# Patient Record
Sex: MALE | Race: WHITE | NOT HISPANIC OR LATINO | Employment: OTHER | URBAN - NONMETROPOLITAN AREA
[De-identification: names, ages, dates, MRNs, and addresses within clinical notes are randomized per-mention and may not be internally consistent; named-entity substitution may affect disease eponyms.]

---

## 2017-07-11 ENCOUNTER — TELEPHONE (OUTPATIENT)
Dept: GASTROENTEROLOGY | Facility: CLINIC | Age: 67
End: 2017-07-11

## 2017-07-11 NOTE — TELEPHONE ENCOUNTER
Patients insurance has changed so had to call into new pharmacy [facility] [phone] esomeprazole 20mg qd #90 3 refills

## 2017-09-12 ENCOUNTER — TRANSCRIBE ORDERS (OUTPATIENT)
Dept: ADMINISTRATIVE | Facility: HOSPITAL | Age: 67
End: 2017-09-12

## 2017-09-12 ENCOUNTER — APPOINTMENT (OUTPATIENT)
Dept: LAB | Facility: HOSPITAL | Age: 67
End: 2017-09-12

## 2017-09-12 DIAGNOSIS — R50.9 FEVER, UNSPECIFIED: Primary | ICD-10-CM

## 2017-09-12 LAB
ALBUMIN SERPL-MCNC: 4.7 G/DL (ref 3.5–5)
ALBUMIN/GLOB SERPL: 1.6 G/DL (ref 1.1–2.5)
ALP SERPL-CCNC: 62 U/L (ref 24–120)
ALT SERPL W P-5'-P-CCNC: 73 U/L (ref 0–54)
ANION GAP SERPL CALCULATED.3IONS-SCNC: 14 MMOL/L (ref 4–13)
AST SERPL-CCNC: 49 U/L (ref 7–45)
BASOPHILS # BLD AUTO: 0.02 10*3/MM3 (ref 0–0.2)
BASOPHILS NFR BLD AUTO: 0.1 % (ref 0–2)
BILIRUB SERPL-MCNC: 1 MG/DL (ref 0.1–1)
BILIRUB UR QL STRIP: NEGATIVE
BUN BLD-MCNC: 10 MG/DL (ref 5–21)
BUN/CREAT SERPL: 11.1 (ref 7–25)
CALCIUM SPEC-SCNC: 9.3 MG/DL (ref 8.4–10.4)
CHLORIDE SERPL-SCNC: 99 MMOL/L (ref 98–110)
CLARITY UR: CLEAR
CO2 SERPL-SCNC: 26 MMOL/L (ref 24–31)
COLOR UR: YELLOW
CREAT BLD-MCNC: 0.9 MG/DL (ref 0.5–1.4)
CRP SERPL-MCNC: 1.4 MG/DL (ref 0–0.99)
DEPRECATED RDW RBC AUTO: 45 FL (ref 40–54)
EOSINOPHIL # BLD AUTO: 0.11 10*3/MM3 (ref 0–0.7)
EOSINOPHIL NFR BLD AUTO: 0.8 % (ref 0–4)
ERYTHROCYTE [DISTWIDTH] IN BLOOD BY AUTOMATED COUNT: 13 % (ref 12–15)
ERYTHROCYTE [SEDIMENTATION RATE] IN BLOOD: <1 MM/HR (ref 0–15)
GFR SERPL CREATININE-BSD FRML MDRD: 84 ML/MIN/1.73
GLOBULIN UR ELPH-MCNC: 2.9 GM/DL
GLUCOSE BLD-MCNC: 172 MG/DL (ref 70–100)
GLUCOSE UR STRIP-MCNC: NEGATIVE MG/DL
HCT VFR BLD AUTO: 44.6 % (ref 40–52)
HGB BLD-MCNC: 15.1 G/DL (ref 14–18)
HGB UR QL STRIP.AUTO: NEGATIVE
IMM GRANULOCYTES # BLD: 0.04 10*3/MM3 (ref 0–0.03)
IMM GRANULOCYTES NFR BLD: 0.3 % (ref 0–5)
KETONES UR QL STRIP: ABNORMAL
LEUKOCYTE ESTERASE UR QL STRIP.AUTO: NEGATIVE
LYMPHOCYTES # BLD AUTO: 0.91 10*3/MM3 (ref 0.72–4.86)
LYMPHOCYTES NFR BLD AUTO: 6.6 % (ref 15–45)
MCH RBC QN AUTO: 32.3 PG (ref 28–32)
MCHC RBC AUTO-ENTMCNC: 33.9 G/DL (ref 33–36)
MCV RBC AUTO: 95.5 FL (ref 82–95)
MONOCYTES # BLD AUTO: 1.36 10*3/MM3 (ref 0.19–1.3)
MONOCYTES NFR BLD AUTO: 9.8 % (ref 4–12)
NEUTROPHILS # BLD AUTO: 11.41 10*3/MM3 (ref 1.87–8.4)
NEUTROPHILS NFR BLD AUTO: 82.4 % (ref 39–78)
NITRITE UR QL STRIP: NEGATIVE
PH UR STRIP.AUTO: 6 [PH] (ref 5–8)
PLATELET # BLD AUTO: 189 10*3/MM3 (ref 130–400)
PMV BLD AUTO: 11.5 FL (ref 6–12)
POTASSIUM BLD-SCNC: 3.9 MMOL/L (ref 3.5–5.3)
PROT SERPL-MCNC: 7.6 G/DL (ref 6.3–8.7)
PROT UR QL STRIP: NEGATIVE
RBC # BLD AUTO: 4.67 10*6/MM3 (ref 4.8–5.9)
SODIUM BLD-SCNC: 139 MMOL/L (ref 135–145)
SP GR UR STRIP: 1.02 (ref 1–1.03)
UROBILINOGEN UR QL STRIP: ABNORMAL
WBC NRBC COR # BLD: 13.85 10*3/MM3 (ref 4.8–10.8)

## 2017-09-12 PROCEDURE — 36415 COLL VENOUS BLD VENIPUNCTURE: CPT

## 2017-09-12 PROCEDURE — 85651 RBC SED RATE NONAUTOMATED: CPT | Performed by: PHYSICIAN ASSISTANT

## 2017-09-12 PROCEDURE — 81003 URINALYSIS AUTO W/O SCOPE: CPT | Performed by: PHYSICIAN ASSISTANT

## 2017-09-12 PROCEDURE — 85025 COMPLETE CBC W/AUTO DIFF WBC: CPT | Performed by: PHYSICIAN ASSISTANT

## 2017-09-12 PROCEDURE — 80053 COMPREHEN METABOLIC PANEL: CPT | Performed by: PHYSICIAN ASSISTANT

## 2017-09-12 PROCEDURE — 86140 C-REACTIVE PROTEIN: CPT | Performed by: PHYSICIAN ASSISTANT

## 2017-10-09 ENCOUNTER — OFFICE VISIT (OUTPATIENT)
Dept: GASTROENTEROLOGY | Facility: CLINIC | Age: 67
End: 2017-10-09

## 2017-10-09 VITALS
SYSTOLIC BLOOD PRESSURE: 138 MMHG | DIASTOLIC BLOOD PRESSURE: 80 MMHG | HEART RATE: 74 BPM | BODY MASS INDEX: 30.34 KG/M2 | OXYGEN SATURATION: 100 % | WEIGHT: 224 LBS | TEMPERATURE: 97.2 F | HEIGHT: 72 IN

## 2017-10-09 DIAGNOSIS — K21.9 GASTROESOPHAGEAL REFLUX DISEASE, ESOPHAGITIS PRESENCE NOT SPECIFIED: Primary | ICD-10-CM

## 2017-10-09 PROCEDURE — 99212 OFFICE O/P EST SF 10 MIN: CPT | Performed by: NURSE PRACTITIONER

## 2017-10-09 NOTE — PROGRESS NOTES
Chief Complaint   Patient presents with   • Heartburn     takes nexium needs refilled     Subjective   HPI    Pt presents to office with long history of GERD related symptoms.  GERD currently described as stable  Pt is maintained on Nexium daily .  Pt denies heartburn, indigestion, N/V, abdominal pain, dysphagia.  Pt is compliant with medication instruction.  Last EGD: 2015 showed active esophagitis.  This procedure reviewed with patient.    CScope (Dr Serna) 2015 tubulovillous polyp removed, recommended for 5 yr recall    Past Medical History:   Diagnosis Date   • Abdominal pain    • Abnormal liver enzymes    • Colon polyps    • Constipation    • Diabetes mellitus    • Diarrhea    • History of rectal bleeding    • Hyperlipidemia    • Hypertension    • Insomnia      Outpatient Prescriptions Marked as Taking for the 10/9/17 encounter (Office Visit) with ALFRED Reilly   Medication Sig Dispense Refill   • aspirin 325 MG tablet Take 325 mg by mouth Daily.     • esomeprazole (nexIUM) 20 MG capsule Take 1 capsule by mouth Every Morning Before Breakfast. 90 capsule 3   • metFORMIN (GLUCOPHAGE) 500 MG tablet Take 1,000 mg by mouth 2 (Two) Times a Day With Meals.     • ramipril (ALTACE) 5 MG capsule Take 5 mg by mouth Daily.     • rosuvastatin (CRESTOR) 10 MG tablet Take 10 mg by mouth Daily.     • zolpidem (AMBIEN) 10 MG tablet Take 10 mg by mouth At Night As Needed for sleep.     • [DISCONTINUED] esomeprazole (NexIUM) 20 MG capsule Take 20 mg by mouth Every Morning Before Breakfast.       Allergies   Allergen Reactions   • Demerol [Meperidine]      Social History     Social History   • Marital status:      Spouse name: N/A   • Number of children: N/A   • Years of education: N/A     Occupational History   • Not on file.     Social History Main Topics   • Smoking status: Never Smoker   • Smokeless tobacco: Never Used   • Alcohol use Yes      Comment: not very often   • Drug use: No   • Sexual activity:  Not on file     Other Topics Concern   • Not on file     Social History Narrative     Family History   Problem Relation Age of Onset   • Anal fissures Father    • Colon cancer Neg Hx    • Colon polyps Neg Hx      Review of Systems   Constitutional: Negative for fatigue, fever and unexpected weight change.   HENT: Negative for hearing loss, sore throat and voice change.    Eyes: Negative for visual disturbance.   Respiratory: Negative for cough, shortness of breath and wheezing.    Cardiovascular: Negative for chest pain and palpitations.   Gastrointestinal: Negative for abdominal pain, blood in stool and vomiting.   Endocrine: Negative for polydipsia and polyuria.   Genitourinary: Negative for difficulty urinating, dysuria, hematuria and urgency.   Musculoskeletal: Negative for joint swelling and myalgias.   Skin: Negative for color change, rash and wound.   Neurological: Negative for dizziness, tremors, seizures and syncope.   Hematological: Does not bruise/bleed easily.   Psychiatric/Behavioral: Negative for agitation and confusion. The patient is not nervous/anxious.      Objective   Vitals:    10/09/17 1031   BP: 138/80   Pulse: 74   Temp: 97.2 °F (36.2 °C)   SpO2: 100%     Physical Exam   Constitutional: He is oriented to person, place, and time. He appears well-developed and well-nourished.   HENT:   Head: Normocephalic and atraumatic.   Eyes:   Pink, Nonicteric   Neck:   Global Assessment- supple. No JVD or lymphadenopathy   Cardiovascular: Normal rate, regular rhythm and normal heart sounds.  Exam reveals no gallop and no friction rub.    No murmur heard.  Pulmonary/Chest: Effort normal and breath sounds normal. No respiratory distress. He has no wheezes. He has no rales.   Inspection: Movements-Symmetrical   Abdominal: Soft. Bowel sounds are normal. He exhibits no distension and no mass. There is no tenderness. There is no rebound and no guarding.   Neurological: He is alert and oriented to person, place,  and time.   General Exam-Deemed a reliable historian, able to converse without difficulty and Able to move all extremities without difficulty     Imaging Results (most recent)     None        Assessment/Plan   Sundar was seen today for heartburn.    Diagnoses and all orders for this visit:    Gastroesophageal reflux disease, esophagitis presence not specified  -     esomeprazole (nexIUM) 20 MG capsule; Take 1 capsule by mouth Every Morning Before Breakfast.      * Surgery not found *    Patient Instructions   Gastroesophageal Reflux Disease, Adult    Gastroesophageal reflux disease (GERD) happens when acid from your stomach flows up into the esophagus. When acid comes in contact with the esophagus, the acid causes soreness (inflammation) in the esophagus. Over time, GERD may create small holes (ulcers) in the lining of the esophagus.  CAUSES    · Increased body weight. This puts pressure on the stomach, making acid rise from the stomach into the esophagus.  · Smoking. This increases acid production in the stomach.  · Drinking alcohol. This causes decreased pressure in the lower esophageal sphincter (valve or ring of muscle between the esophagus and stomach), allowing acid from the stomach into the esophagus.  · Late evening meals and a full stomach. This increases pressure and acid production in the stomach.  · A malformed lower esophageal sphincter.  Sometimes, no cause is found.  SYMPTOMS    · Burning pain in the lower part of the mid-chest behind the breastbone and in the mid-stomach area. This may occur twice a week or more often.  · Trouble swallowing.  · Sore throat.  · Dry cough.  · Asthma-like symptoms including chest tightness, shortness of breath, or wheezing.  DIAGNOSIS    Your caregiver may be able to diagnose GERD based on your symptoms. In some cases, X-rays and other tests may be done to check for complications or to check the condition of your stomach and esophagus.  TREATMENT    Your caregiver may  recommend over-the-counter or prescription medicines to help decrease acid production. Ask your caregiver before starting or adding any new medicines.    HOME CARE INSTRUCTIONS    · Change the factors that you can control. Ask your caregiver for guidance concerning weight loss, quitting smoking, and alcohol consumption.  · Avoid foods and drinks that make your symptoms worse, such as:  ¨ Caffeine or alcoholic drinks.  ¨ Chocolate.  ¨ Peppermint or mint flavorings.  ¨ Garlic and onions.  ¨ Spicy foods.  ¨ Citrus fruits, such as oranges, lesly, or limes.  ¨ Tomato-based foods such as sauce, chili, salsa, and pizza.  ¨ Fried and fatty foods.  · Avoid lying down for the 3 hours prior to your bedtime or prior to taking a nap.  · Eat small, frequent meals instead of large meals.  · Wear loose-fitting clothing. Do not wear anything tight around your waist that causes pressure on your stomach.  · Raise the head of your bed 6 to 8 inches with wood blocks to help you sleep. Extra pillows will not help.  · Only take over-the-counter or prescription medicines for pain, discomfort, or fever as directed by your caregiver.  · Do not take aspirin, ibuprofen, or other nonsteroidal anti-inflammatory drugs (NSAIDs).  SEEK IMMEDIATE MEDICAL CARE IF:    · You have pain in your arms, neck, jaw, teeth, or back.  · Your pain increases or changes in intensity or duration.  · You develop nausea, vomiting, or sweating (diaphoresis).  · You develop shortness of breath, or you faint.  · Your vomit is green, yellow, black, or looks like coffee grounds or blood.  · Your stool is red, bloody, or black.  These symptoms could be signs of other problems, such as heart disease, gastric bleeding, or esophageal bleeding.  MAKE SURE YOU:    · Understand these instructions.  · Will watch your condition.  · Will get help right away if you are not doing well or get worse.     This information is not intended to replace advice given to you by your health  care provider. Make sure you discuss any questions you have with your health care provider.     Document Released: 09/27/2006 Document Revised: 01/08/2016 Document Reviewed: 04/13/2016  [employer] Interactive Patient Education ©2016 [employer]

## 2017-10-09 NOTE — PATIENT INSTRUCTIONS
Gastroesophageal Reflux Disease, Adult    Gastroesophageal reflux disease (GERD) happens when acid from your stomach flows up into the esophagus. When acid comes in contact with the esophagus, the acid causes soreness (inflammation) in the esophagus. Over time, GERD may create small holes (ulcers) in the lining of the esophagus.  CAUSES    · Increased body weight. This puts pressure on the stomach, making acid rise from the stomach into the esophagus.  · Smoking. This increases acid production in the stomach.  · Drinking alcohol. This causes decreased pressure in the lower esophageal sphincter (valve or ring of muscle between the esophagus and stomach), allowing acid from the stomach into the esophagus.  · Late evening meals and a full stomach. This increases pressure and acid production in the stomach.  · A malformed lower esophageal sphincter.  Sometimes, no cause is found.  SYMPTOMS    · Burning pain in the lower part of the mid-chest behind the breastbone and in the mid-stomach area. This may occur twice a week or more often.  · Trouble swallowing.  · Sore throat.  · Dry cough.  · Asthma-like symptoms including chest tightness, shortness of breath, or wheezing.  DIAGNOSIS    Your caregiver may be able to diagnose GERD based on your symptoms. In some cases, X-rays and other tests may be done to check for complications or to check the condition of your stomach and esophagus.  TREATMENT    Your caregiver may recommend over-the-counter or prescription medicines to help decrease acid production. Ask your caregiver before starting or adding any new medicines.    HOME CARE INSTRUCTIONS    · Change the factors that you can control. Ask your caregiver for guidance concerning weight loss, quitting smoking, and alcohol consumption.  · Avoid foods and drinks that make your symptoms worse, such as:  ¨ Caffeine or alcoholic drinks.  ¨ Chocolate.  ¨ Peppermint or mint flavorings.  ¨ Garlic and onions.  ¨ Spicy foods.  ¨ Citrus  fruits, such as oranges, lesly, or limes.  ¨ Tomato-based foods such as sauce, chili, salsa, and pizza.  ¨ Fried and fatty foods.  · Avoid lying down for the 3 hours prior to your bedtime or prior to taking a nap.  · Eat small, frequent meals instead of large meals.  · Wear loose-fitting clothing. Do not wear anything tight around your waist that causes pressure on your stomach.  · Raise the head of your bed 6 to 8 inches with wood blocks to help you sleep. Extra pillows will not help.  · Only take over-the-counter or prescription medicines for pain, discomfort, or fever as directed by your caregiver.  · Do not take aspirin, ibuprofen, or other nonsteroidal anti-inflammatory drugs (NSAIDs).  SEEK IMMEDIATE MEDICAL CARE IF:    · You have pain in your arms, neck, jaw, teeth, or back.  · Your pain increases or changes in intensity or duration.  · You develop nausea, vomiting, or sweating (diaphoresis).  · You develop shortness of breath, or you faint.  · Your vomit is green, yellow, black, or looks like coffee grounds or blood.  · Your stool is red, bloody, or black.  These symptoms could be signs of other problems, such as heart disease, gastric bleeding, or esophageal bleeding.  MAKE SURE YOU:    · Understand these instructions.  · Will watch your condition.  · Will get help right away if you are not doing well or get worse.     This information is not intended to replace advice given to you by your health care provider. Make sure you discuss any questions you have with your health care provider.     Document Released: 09/27/2006 Document Revised: 01/08/2016 Document Reviewed: 04/13/2016  [employer] Interactive Patient Education ©2016 [employer]

## 2017-11-09 ENCOUNTER — TRANSCRIBE ORDERS (OUTPATIENT)
Dept: ADMINISTRATIVE | Facility: HOSPITAL | Age: 67
End: 2017-11-09

## 2017-11-09 DIAGNOSIS — K76.0 FATTY METAMORPHOSIS OF LIVER: Primary | ICD-10-CM

## 2017-11-16 ENCOUNTER — HOSPITAL ENCOUNTER (OUTPATIENT)
Dept: ULTRASOUND IMAGING | Facility: HOSPITAL | Age: 67
Discharge: HOME | End: 2017-11-16
Admitting: PHYSICIAN ASSISTANT

## 2017-11-16 DIAGNOSIS — K76.0 FATTY METAMORPHOSIS OF LIVER: ICD-10-CM

## 2017-11-16 PROCEDURE — 76705 ECHO EXAM OF ABDOMEN: CPT

## 2018-10-04 DIAGNOSIS — K21.9 GASTROESOPHAGEAL REFLUX DISEASE, ESOPHAGITIS PRESENCE NOT SPECIFIED: ICD-10-CM

## 2019-05-13 ENCOUNTER — TRANSCRIBE ORDERS (OUTPATIENT)
Dept: ADMINISTRATIVE | Facility: HOSPITAL | Age: 69
End: 2019-05-13

## 2019-05-13 DIAGNOSIS — M10.9 GOUT, UNSPECIFIED CAUSE, UNSPECIFIED CHRONICITY, UNSPECIFIED SITE: Primary | ICD-10-CM

## 2021-03-17 NOTE — PROGRESS NOTES
Chief Complaint   Patient presents with   • Colonoscopy     7-6-2015 colon small polyp 5 year recall       PCP: Oren Rivera MD  REFER: No ref. provider found    Subjective     HPI    Sundar Rojas is a 70 y.o. male who presents to office for preventative maintenance.  There is  a personal history of colon polyps.  There is not a history of colon cancer.  He does not have complaints of nausea/vomiting, change in bowels, weight loss, no BRBPR, no melena.  There is not a family history of colon cancer.  There is not a family history of colon polyps.  His last colonoscopy-2015 .  Bowels do move on regular basis.    CScope (Dr Serna) 2015-tubullovillous adenoma       Past Medical History:   Diagnosis Date   • Abdominal pain    • Abnormal liver enzymes    • Colon polyps    • Constipation    • Diabetes mellitus (CMS/HCC)    • Diarrhea    • History of rectal bleeding    • Hyperlipidemia    • Hypertension    • Insomnia      Past Surgical History:   Procedure Laterality Date   • CHOLECYSTECTOMY     • COLONOSCOPY  06/04/2012    tubulobillous adenoma 3 year   • COLONOSCOPY  06/07/2010    multiple polypecytomies - see path -- 2 yr   • COLONOSCOPY  04/06/2004   • ENDOSCOPY  02/16/2007   • HEMORRHOIDECTOMY     • KNEE ARTHROSCOPY       Outpatient Medications Marked as Taking for the 3/18/21 encounter (Office Visit) with Maximino Baptiste APRN   Medication Sig Dispense Refill   • amLODIPine (NORVASC) 5 MG tablet Take 5 mg by mouth Daily.     • aspirin 81 MG EC tablet Take 81 mg by mouth Daily.     • esomeprazole (nexIUM) 20 MG capsule Take 1 capsule by mouth Daily. 180 capsule 3   • glimepiride (AMARYL) 2 MG tablet Take 2 mg by mouth 2 (Two) Times a Day.     • metFORMIN (GLUCOPHAGE) 500 MG tablet Take 1,000 mg by mouth 2 (Two) Times a Day With Meals.     • ramipril (ALTACE) 5 MG capsule Take 5 mg by mouth Daily.     • rosuvastatin (CRESTOR) 10 MG tablet Take 10 mg by mouth Daily.     • zolpidem (AMBIEN) 10 MG tablet  Take 10 mg by mouth At Night As Needed for sleep.       Allergies   Allergen Reactions   • Demerol [Meperidine]      Social History     Socioeconomic History   • Marital status:      Spouse name: Not on file   • Number of children: Not on file   • Years of education: Not on file   • Highest education level: Not on file   Tobacco Use   • Smoking status: Never Smoker   • Smokeless tobacco: Never Used   Substance and Sexual Activity   • Alcohol use: Yes     Comment: not very often   • Drug use: No     Review of Systems   Constitutional: Negative for unexpected weight change.   Respiratory: Negative for shortness of breath.    Cardiovascular: Negative for chest pain.   Gastrointestinal: Negative for abdominal pain and anal bleeding.     Objective   Vitals:    03/18/21 0938   BP: 140/80   Pulse: 70   Temp: 98.4 °F (36.9 °C)   SpO2: 98%     Physical Exam  Constitutional:       Appearance: Normal appearance. He is well-developed.   Eyes:      General: No scleral icterus.  Cardiovascular:      Rate and Rhythm: Regular rhythm.      Heart sounds: Normal heart sounds. No murmur heard.     Pulmonary:      Effort: Pulmonary effort is normal. No accessory muscle usage.      Breath sounds: Normal breath sounds.   Abdominal:      General: Bowel sounds are normal. There is no distension.      Palpations: Abdomen is soft. There is no mass.      Tenderness: There is no abdominal tenderness. There is no guarding or rebound.   Skin:     General: Skin is warm and dry.      Coloration: Skin is not jaundiced.   Neurological:      Mental Status: He is alert.   Psychiatric:         Behavior: Behavior is cooperative.       Imaging Results (Most Recent)     None        Body mass index is 31.06 kg/m².    Assessment/Plan   Diagnoses and all orders for this visit:    1. History of adenomatous polyp of colon (Primary)  -     Case Request; Standing  -     Implement Anesthesia Orders Day of Procedure; Standing  -     Obtain Informed Consent;  Standing  -     Case Request      COLONOSCOPY WITH ANESTHESIA (N/A)    Complain of ulcerations to tongue - offered  EGD as he had active esophagitis on previous scope, patient declined.  Ok to trial pepcid at bedtime x 2 weeks, if no improvement then stop pepcid    Pt to hold diabetes medication/insulin day of procedure to prevent any risk of complications of hypoglycemia intraprocedure.  If taking insulin 1/2 the PM dose as well     Patient is to continue all blood pressure and cardiac medications prior to procedure and has been advised to take medications morning of procedure   Pt verbalized understanding    Advised pt to stop use of NSAIDs, Fish Oil, and MV 5 days prior to procedure, per Dr Serna protocol.  Tylenol based products are ok to take.  Pt verbalized understanding.     All risks, benefits, alternatives, and indications of colonoscopy procedure have been discussed with the patient. Risks to include perforation of the colon requiring possible surgery or colostomy, risk of bleeding from biopsies or removal of colon tissue, possibility of missing a colon polyp or cancer, or adverse drug reaction.  Benefits to include the diagnosis and management of disease of the colon and rectum. Alternatives to include barium enema, radiographic evaluation, lab testing or no intervention. He verbalizes understanding and agrees.     Precautions are currently being put in place due to COVID-19.  I have explained to Sundar Rojas they will be required to undergo COVID testing prior to their procedure.  Sundar Rojas verbalized understanding and was willing to proceed.     Patient's Body mass index is 31.06 kg/m². BMI is above normal parameters. Recommendations include: no follow up.          Maximino Baptiste, APRN  03/18/21        There are no Patient Instructions on file for this visit.

## 2021-03-18 ENCOUNTER — OFFICE VISIT (OUTPATIENT)
Dept: GASTROENTEROLOGY | Facility: CLINIC | Age: 71
End: 2021-03-18

## 2021-03-18 VITALS
TEMPERATURE: 98.4 F | OXYGEN SATURATION: 98 % | HEART RATE: 70 BPM | WEIGHT: 229 LBS | HEIGHT: 72 IN | BODY MASS INDEX: 31.02 KG/M2 | SYSTOLIC BLOOD PRESSURE: 140 MMHG | DIASTOLIC BLOOD PRESSURE: 80 MMHG

## 2021-03-18 DIAGNOSIS — Z86.010 HISTORY OF ADENOMATOUS POLYP OF COLON: Primary | ICD-10-CM

## 2021-03-18 PROCEDURE — S0260 H&P FOR SURGERY: HCPCS | Performed by: NURSE PRACTITIONER

## 2021-03-18 RX ORDER — ASPIRIN 81 MG/1
81 TABLET ORAL DAILY
COMMUNITY

## 2021-03-18 RX ORDER — AMLODIPINE BESYLATE 5 MG/1
5 TABLET ORAL DAILY
COMMUNITY

## 2021-03-18 RX ORDER — GLIMEPIRIDE 2 MG/1
2 TABLET ORAL 2 TIMES DAILY
COMMUNITY
Start: 2021-03-12

## 2021-03-23 ENCOUNTER — TELEPHONE (OUTPATIENT)
Dept: GASTROENTEROLOGY | Facility: CLINIC | Age: 71
End: 2021-03-23

## 2021-03-31 ENCOUNTER — TRANSCRIBE ORDERS (OUTPATIENT)
Dept: LAB | Facility: HOSPITAL | Age: 71
End: 2021-03-31

## 2021-03-31 DIAGNOSIS — Z01.818 PREOPERATIVE TESTING: Primary | ICD-10-CM

## 2021-04-03 ENCOUNTER — LAB (OUTPATIENT)
Dept: LAB | Facility: HOSPITAL | Age: 71
End: 2021-04-03

## 2021-04-03 LAB — SARS-COV-2 ORF1AB RESP QL NAA+PROBE: NOT DETECTED

## 2021-04-03 PROCEDURE — C9803 HOPD COVID-19 SPEC COLLECT: HCPCS | Performed by: INTERNAL MEDICINE

## 2021-04-03 PROCEDURE — U0004 COV-19 TEST NON-CDC HGH THRU: HCPCS | Performed by: INTERNAL MEDICINE

## 2021-04-03 PROCEDURE — U0005 INFEC AGEN DETEC AMPLI PROBE: HCPCS | Performed by: INTERNAL MEDICINE

## 2021-04-06 ENCOUNTER — ANESTHESIA EVENT (OUTPATIENT)
Dept: GASTROENTEROLOGY | Facility: HOSPITAL | Age: 71
End: 2021-04-06

## 2021-04-06 ENCOUNTER — ANESTHESIA (OUTPATIENT)
Dept: GASTROENTEROLOGY | Facility: HOSPITAL | Age: 71
End: 2021-04-06

## 2021-04-06 ENCOUNTER — HOSPITAL ENCOUNTER (OUTPATIENT)
Facility: HOSPITAL | Age: 71
Setting detail: HOSPITAL OUTPATIENT SURGERY
Discharge: HOME | End: 2021-04-06
Attending: INTERNAL MEDICINE | Admitting: INTERNAL MEDICINE

## 2021-04-06 ENCOUNTER — TELEPHONE (OUTPATIENT)
Dept: GASTROENTEROLOGY | Facility: CLINIC | Age: 71
End: 2021-04-06

## 2021-04-06 VITALS
RESPIRATION RATE: 16 BRPM | TEMPERATURE: 97.4 F | BODY MASS INDEX: 30.61 KG/M2 | HEIGHT: 72 IN | OXYGEN SATURATION: 95 % | WEIGHT: 226 LBS | SYSTOLIC BLOOD PRESSURE: 113 MMHG | DIASTOLIC BLOOD PRESSURE: 68 MMHG | HEART RATE: 82 BPM

## 2021-04-06 DIAGNOSIS — Z86.010 HISTORY OF ADENOMATOUS POLYP OF COLON: ICD-10-CM

## 2021-04-06 LAB — GLUCOSE BLDC GLUCOMTR-MCNC: 138 MG/DL (ref 70–130)

## 2021-04-06 PROCEDURE — 43239 EGD BIOPSY SINGLE/MULTIPLE: CPT | Performed by: INTERNAL MEDICINE

## 2021-04-06 PROCEDURE — 82962 GLUCOSE BLOOD TEST: CPT

## 2021-04-06 PROCEDURE — 45385 COLONOSCOPY W/LESION REMOVAL: CPT | Performed by: INTERNAL MEDICINE

## 2021-04-06 PROCEDURE — 87081 CULTURE SCREEN ONLY: CPT | Performed by: INTERNAL MEDICINE

## 2021-04-06 PROCEDURE — 25010000002 PROPOFOL 10 MG/ML EMULSION: Performed by: NURSE ANESTHETIST, CERTIFIED REGISTERED

## 2021-04-06 RX ORDER — MIDAZOLAM HYDROCHLORIDE 1 MG/ML
0.5 INJECTION, SOLUTION INTRAMUSCULAR; INTRAVENOUS
Status: CANCELLED | OUTPATIENT
Start: 2021-04-06

## 2021-04-06 RX ORDER — MIDAZOLAM HYDROCHLORIDE 1 MG/ML
1 INJECTION, SOLUTION INTRAMUSCULAR; INTRAVENOUS
Status: CANCELLED | OUTPATIENT
Start: 2021-04-06

## 2021-04-06 RX ORDER — SODIUM CHLORIDE 9 MG/ML
500 INJECTION, SOLUTION INTRAVENOUS CONTINUOUS PRN
Status: DISCONTINUED | OUTPATIENT
Start: 2021-04-06 | End: 2021-04-06 | Stop reason: HOSPADM

## 2021-04-06 RX ORDER — LIDOCAINE HYDROCHLORIDE 10 MG/ML
0.5 INJECTION, SOLUTION EPIDURAL; INFILTRATION; INTRACAUDAL; PERINEURAL ONCE AS NEEDED
Status: CANCELLED | OUTPATIENT
Start: 2021-04-06

## 2021-04-06 RX ORDER — PROPOFOL 10 MG/ML
VIAL (ML) INTRAVENOUS AS NEEDED
Status: DISCONTINUED | OUTPATIENT
Start: 2021-04-06 | End: 2021-04-06 | Stop reason: SURG

## 2021-04-06 RX ORDER — SODIUM CHLORIDE 0.9 % (FLUSH) 0.9 %
10 SYRINGE (ML) INJECTION EVERY 12 HOURS SCHEDULED
Status: CANCELLED | OUTPATIENT
Start: 2021-04-06

## 2021-04-06 RX ORDER — SODIUM CHLORIDE 0.9 % (FLUSH) 0.9 %
10 SYRINGE (ML) INJECTION AS NEEDED
Status: CANCELLED | OUTPATIENT
Start: 2021-04-06

## 2021-04-06 RX ORDER — SODIUM CHLORIDE 0.9 % (FLUSH) 0.9 %
10 SYRINGE (ML) INJECTION AS NEEDED
Status: DISCONTINUED | OUTPATIENT
Start: 2021-04-06 | End: 2021-04-06 | Stop reason: HOSPADM

## 2021-04-06 RX ORDER — SODIUM CHLORIDE 9 MG/ML
100 INJECTION, SOLUTION INTRAVENOUS CONTINUOUS
Status: CANCELLED | OUTPATIENT
Start: 2021-04-06

## 2021-04-06 RX ORDER — LIDOCAINE HYDROCHLORIDE 20 MG/ML
INJECTION, SOLUTION EPIDURAL; INFILTRATION; INTRACAUDAL; PERINEURAL AS NEEDED
Status: DISCONTINUED | OUTPATIENT
Start: 2021-04-06 | End: 2021-04-06 | Stop reason: SURG

## 2021-04-06 RX ADMIN — PROPOFOL 100 MG: 10 INJECTION, EMULSION INTRAVENOUS at 08:16

## 2021-04-06 RX ADMIN — PROPOFOL 100 MG: 10 INJECTION, EMULSION INTRAVENOUS at 08:23

## 2021-04-06 RX ADMIN — SODIUM CHLORIDE 500 ML: 9 INJECTION, SOLUTION INTRAVENOUS at 07:27

## 2021-04-06 RX ADMIN — PROPOFOL 50 MG: 10 INJECTION, EMULSION INTRAVENOUS at 08:31

## 2021-04-06 RX ADMIN — PROPOFOL 50 MG: 10 INJECTION, EMULSION INTRAVENOUS at 08:28

## 2021-04-06 RX ADMIN — LIDOCAINE HYDROCHLORIDE 100 MG: 20 INJECTION, SOLUTION EPIDURAL; INFILTRATION; INTRACAUDAL; PERINEURAL at 08:16

## 2021-04-06 NOTE — ANESTHESIA POSTPROCEDURE EVALUATION
Patient: Sundar Rojas    Procedure Summary     Date: 04/06/21 Room / Location: [facility] ENDOSCOPY 6 / [facility]    Anesthesia Start: 0814 Anesthesia Stop: 0835    Procedures:       COLONOSCOPY WITH ANESTHESIA (N/A )      ESOPHAGOGASTRODUODENOSCOPY WITH ANESTHESIA (N/A ) Diagnosis:       History of adenomatous polyp of colon      (History of adenomatous polyp of colon [Z86.010])    Surgeons: Lopez Serna DO Provider: Vern Mckeon CRNA    Anesthesia Type: MAC ASA Status: 2          Anesthesia Type: MAC    Vitals  Vitals Value Taken Time   BP     Temp     Pulse 73 04/06/21 0835   Resp     SpO2 96 % 04/06/21 0835   Vitals shown include unvalidated device data.        Post Anesthesia Care and Evaluation    Patient location during evaluation: PHASE II  Patient participation: complete - patient participated  Level of consciousness: awake and alert  Pain score: 0  Pain management: adequate  Airway patency: patent  Anesthetic complications: No anesthetic complications  PONV Status: none  Cardiovascular status: acceptable and stable  Respiratory status: acceptable  Hydration status: acceptable

## 2021-04-06 NOTE — ANESTHESIA PREPROCEDURE EVALUATION
Anesthesia Evaluation     Patient summary reviewed and Nursing notes reviewed   NPO Solid Status: > 8 hours  NPO Liquid Status: > 4 hours           Airway   Mallampati: I  TM distance: >3 FB  No difficulty expected  Dental      Pulmonary - negative pulmonary ROS and normal exam   Cardiovascular - normal exam  Exercise tolerance: good (4-7 METS)    (+) hypertension well controlled less than 2 medications, hyperlipidemia,       Neuro/Psych- negative ROS  GI/Hepatic/Renal/Endo    (+)  GERD well controlled,  liver disease fatty liver disease, diabetes mellitus type 2 well controlled,     Musculoskeletal (-) negative ROS    Abdominal  - normal exam   Substance History - negative use     OB/GYN negative ob/gyn ROS         Other - negative ROS                       Anesthesia Plan    ASA 2     MAC     intravenous induction     Anesthetic plan, all risks, benefits, and alternatives have been provided, discussed and informed consent has been obtained with: patient and spouse/significant other.

## 2021-04-07 LAB — UREASE TISS QL: NEGATIVE

## 2021-12-21 ENCOUNTER — OFFICE VISIT (OUTPATIENT)
Dept: GASTROENTEROLOGY | Facility: CLINIC | Age: 71
End: 2021-12-21

## 2021-12-21 DIAGNOSIS — R19.8 ALTERED BOWEL FUNCTION: Primary | ICD-10-CM

## 2021-12-21 PROCEDURE — 99213 OFFICE O/P EST LOW 20 MIN: CPT | Performed by: NURSE PRACTITIONER

## 2021-12-21 NOTE — PROGRESS NOTES
Chief Complaint   Patient presents with   • Abdominal Pain     lower stomach problems lots of gas lots of diarrhea       PCP: Oren Rivera MD  REFER: No ref. provider found    Subjective     HPI    Change in bowel habit over past 2 months.  Normal bowel habit described as 2-3 BM in morning on daily basis.  First bowel movement described as regular with 2nd and 3rd BM being loose until it is watery.  He has lower abdominal bloating with pressure to lower abdomen.  He was treated with 2 rounds of antibiotic in Nov for this complaint.  He started taking omeprazole daily with improvement in bloating.  Normal stool is thin in shape.  After he eats he complain of pain that travels through abdomen.   Currently trying to lose weight with low carb diet.  A1C was elevated.      CScope (Dr Serna) 4/2021  Endoscopy (Dr Serna) 4/2021  - non severe esophagitis    Wt Readings from Last 3 Encounters:   04/06/21 103 kg (226 lb)   03/18/21 104 kg (229 lb)   10/09/17 102 kg (224 lb)          Past Medical History:   Diagnosis Date   • Abdominal pain    • Abnormal liver enzymes    • Colon polyps    • Constipation    • Diabetes mellitus (HCC)    • Diarrhea    • GERD (gastroesophageal reflux disease)    • History of rectal bleeding    • Hyperlipidemia    • Hypertension    • Insomnia        Past Surgical History:   Procedure Laterality Date   • CHOLECYSTECTOMY     • COLONOSCOPY  06/04/2012    tubulobillous adenoma 3 year   • COLONOSCOPY  06/07/2010    multiple polypecytomies - see path -- 2 yr   • COLONOSCOPY  04/06/2004   • COLONOSCOPY N/A 4/6/2021    Procedure: COLONOSCOPY WITH ANESTHESIA;  Surgeon: Lopez Serna DO;  Location: [facility] ENDOSCOPY;  Service: Gastroenterology;  Laterality: N/A;  pre hx adenomatous colon polyp  post diverticulosis; polyps   Oren Rivera MD   • ENDOSCOPY  02/16/2007   • ENDOSCOPY N/A 4/6/2021    Procedure: ESOPHAGOGASTRODUODENOSCOPY WITH ANESTHESIA;  Surgeon: Lopez Serna DO;   Location: [facility] ENDOSCOPY;  Service: Gastroenterology;  Laterality: N/A;  pre heartburn  post normal  Oren Rivera MD   • EYE SURGERY Bilateral     cataract   • HEMORRHOIDECTOMY     • KNEE ARTHROSCOPY         Outpatient Medications Marked as Taking for the 12/21/21 encounter (Office Visit) with Maximino Baptiste APRN   Medication Sig Dispense Refill   • amLODIPine (NORVASC) 5 MG tablet Take 5 mg by mouth Daily.     • aspirin 81 MG EC tablet Take 81 mg by mouth Daily.     • esomeprazole (nexIUM) 20 MG capsule Take 1 capsule by mouth Daily. 180 capsule 3   • esomeprazole (nexIUM) 20 MG capsule Take 1 capsule by mouth Every Morning Before Breakfast. 90 capsule 3   • glimepiride (AMARYL) 2 MG tablet Take 2 mg by mouth 2 (Two) Times a Day.     • metFORMIN (GLUCOPHAGE) 500 MG tablet Take 1,000 mg by mouth 2 (Two) Times a Day With Meals.     • Metoprolol Succinate 100 MG capsule extended-release 24 hour sprinkle Take  by mouth.     • ramipril (ALTACE) 5 MG capsule Take 5 mg by mouth Daily.     • rosuvastatin (CRESTOR) 10 MG tablet Take 10 mg by mouth Daily.     • zolpidem (AMBIEN) 10 MG tablet Take 10 mg by mouth At Night As Needed for sleep.         Allergies   Allergen Reactions   • Demerol [Meperidine] Confusion       Social History     Socioeconomic History   • Marital status:    Tobacco Use   • Smoking status: Never Smoker   • Smokeless tobacco: Never Used   Vaping Use   • Vaping Use: Never used   Substance and Sexual Activity   • Alcohol use: Yes     Comment: not very often   • Drug use: No   • Sexual activity: Defer       Review of Systems   Constitutional: Negative for unexpected weight change.   Respiratory: Negative for shortness of breath.    Cardiovascular: Negative for chest pain.   Gastrointestinal: Positive for abdominal pain. Negative for anal bleeding.       Objective     There were no vitals filed for this visit.  There is no height or weight on file to calculate BMI.    Physical  Exam  Constitutional:       Appearance: Normal appearance. He is well-developed.   Eyes:      General: No scleral icterus.  Cardiovascular:      Rate and Rhythm: Regular rhythm.      Heart sounds: Normal heart sounds. No murmur heard.      Pulmonary:      Effort: Pulmonary effort is normal. No accessory muscle usage.      Breath sounds: Normal breath sounds.   Abdominal:      General: Bowel sounds are normal. There is no distension.      Palpations: Abdomen is soft. There is no mass.      Tenderness: There is no abdominal tenderness. There is no guarding or rebound.   Skin:     General: Skin is warm and dry.      Coloration: Skin is not jaundiced.   Neurological:      Mental Status: He is alert.   Psychiatric:         Behavior: Behavior is cooperative.         Imaging Results (Most Recent)     None          There is no height or weight on file to calculate BMI.    Assessment/Plan     There are no diagnoses linked to this encounter.    * Surgery not found *    Due to resent colonoscopy no plans on repeat procedure.   Encouraged to avoid constipation.  Increase daily water intake.  Continue daily physical activity.      If symptoms do not improve with increasing dietary fiber/miralax, could consider repeat procedure    I did discuss changes in diet/fiber/miralax vs proceeding with colonoscopy.  I did explain he has undergone unremarkable colonoscopy and it is not normal for cancers to be slow growing.  Sundar Rojas verbalized understanding wished to make changes to lifestyle.  He will call with update.          Maximino Baptiste, APRN  12/27/21          Patient Instructions   Utilize fiber use, increase daily water consumption  If no improvement ok to add Miralax   Ok to utilize Miralax and adjust as needed    Fiber Content in Foods  Fiber is a substance that is found in plant foods, such as fruits, vegetables, whole grains, nuts, seeds, and beans. As part of your treatment and recovery plan, your health care  provider may recommend that you eat foods that have specific amounts of dietary fiber. Some conditions may require a high-fiber diet while others may require a low-fiber diet.  This sheet gives you information about the dietary fiber content of some common foods. Your health care provider will tell you how much fiber you need in your diet. If you have problems or questions, contact your health care provider or dietitian.  What foods are high in fiber?    Fruits  · Blackberries or raspberries (fresh) -- ½ cup (75 g) has 4 g of fiber.  · Pear (fresh) -- 1 medium (180 g) has 5.5 g of fiber.  · Prunes (dried) -- 6 to 8 pieces (57-76 g) has 5 g of fiber.  · Apple with skin -- 1 medium (182 g) has 4.8 g of fiber.  · Guava -- 1 cup (128 g) has 8.9 g of fiber.  Vegetables  · Peas (frozen) -- ½ cup (80 g) has 4.4 g of fiber.  · Potato with skin (baked) -- 1 medium (173 g) has 4.4 g of fiber.  · Pumpkin (canned) -- ½ cup (122 g) has 5 g of fiber.  · [place] sprouts (cooked) -- ½ cup (78 g) has 4 g of fiber.  · Sweet potato -- ½ cup mashed (124 g) has 4 g of fiber.  · Winter squash -- 1 cup cooked (205 g) has 5.7 g of fiber.  Grains  · Bran cereal -- ½ cup (31 g) has 8.6 g of fiber.  · Bulgur (cooked) -- ½ cup (70 g) has 4 g of fiber.  · Quinoa (cooked) -- 1 cup (185 g) has 5.2 g of fiber.  · Popcorn -- 3 cups (375 g) popped has 5.8 g of fiber.  · Spaghetti, whole wheat -- 1 cup (140 g) has 6 g of fiber.  Meats and other proteins  · Gallego beans (cooked) -- ½ cup (90 g) has 7.7 g of fiber.  · Lentils (cooked) -- ½ cup (90 g) has 7.8 g of fiber.  · Kidney beans (canned) -- ½ cup (92.5 g) has 5.7 g of fiber.  · Soybeans (canned, frozen, or fresh) -- ½ cup (92.5 g) has 5.2 g of fiber.  · Baked beans, plain or vegetarian (canned) -- ½ cup (130 g) has 5.2 g of fiber.  · Garbanzo beans or chickpeas (canned) -- ½ cup (90 g) has 6.6 g of fiber.  · Black beans (cooked) -- ½ cup (86 g) has 7.5 g of fiber.  · White beans or navy beans  (cooked) -- ½ cup (91 g) has 9.3 g of fiber.  The items listed above may not be a complete list of foods with high fiber. Actual amounts of fiber may be different depending on processing. Contact a dietitian for more information.  What foods are moderate in fiber?    Fruits  · Banana -- 1 medium (126 g) has 3.2 g of fiber.  · Melon -- 1 cup (155 g) has 1.4 g of fiber.  · Orange -- 1 small (154 g) has 3.7 g of fiber.  · Raisins -- ¼ cup (40 g) has 1.8 g of fiber.  · Applesauce, sweetened -- ½ cup (125 g) has 1.5 g of fiber.  · Blueberries (fresh) -- ½ cup (75 g) has 1.8 g of fiber.  · Strawberries (fresh, sliced) -- 1 cup (150 g) has 3 g of fiber.  · Cherries -- 1 cup (140 g) has 2.9 g of fiber.  Vegetables  · Broccoli (cooked) -- ½ cup (77.5 g) has 2.1 g of fiber.  · Carrots (cooked) -- ½ cup (77.5 g) has 2.2 g of fiber.  · Corn (canned or frozen) -- ½ cup (82.5 g) has 2.1 g of fiber.  · Potatoes, mashed -- ½ cup (105 g) has 1.6 g of fiber.  · Tomato -- 1 medium (62 g) has 1.5 g of fiber.  · Green beans (canned) -- ½ cup (83 g) has 2 g of fiber.  · Squash, winter -- ½ cup (58 g) has 1 g of fiber.  · Sweet potato, baked -- 1 medium (150 g) has 3 g of fiber.  · Cauliflower (cooked) -- 1/2 cup (90 g) has 2.3 g of fiber.  Grains  · Long-grain brown rice (cooked) -- 1 cup (196 g) has 3.5 g of fiber.  · Bagel, plain -- one 4-inch (10 cm) bagel has 2 g of fiber.  · Instant oatmeal -- ½ cup (120 g) has about 2 g of fiber.  · Macaroni noodles, enriched (cooked) -- 1 cup (140 g) has 2.5 g of fiber.  · Multigrain cereal -- ½ cup (15 g) has about 2-4 g of fiber.  · Whole-wheat bread -- 1 slice (26 g) has 2 g of fiber.  · Whole-wheat spaghetti noodles -- ½ cup (70 g) has 3.2 g of fiber.  · Corn tortilla -- one 6-inch (15 cm) tortilla has 1.5 g of fiber.  Meats and other proteins  · Almonds -- ¼ cup or 1 oz (28 g) has 3.5 g of fiber.  · Sunflower seeds in shell -- ¼ cup or ½ oz (11.5 g) has 1.1 g of fiber.  · Vegetable or soy  pushpa -- 1 pushpa (70 g) has 3.4 g of fiber.  · Walnuts -- ¼ cup or 1 oz (30 g) has 2 g of fiber.  · Flax seed -- 1 Tbsp (7 g) has 2.8 g of fiber.  The items listed above may not be a complete list of foods that have moderate amounts of fiber. Actual amounts of fiber may be different depending on processing. Contact a dietitian for more information.  What foods are low in fiber?    Low-fiber foods contain less than 1 g of fiber per serving. They include:  Fruits  · Fruit juice -- ½ cup or 4 fl oz (118 mL) has 0.5 g of fiber.  Vegetables  · Lettuce -- 1 cup (35 g) has 0.5 g of fiber.  · Cucumber (slices) -- ½ cup (60 g) has 0.3 g of fiber.  · Celery -- 1 stalk (40 g) has 0.1 g of fiber.  Grains  · Flour tortilla -- one 6-inch (15 cm) tortilla has 0.5 g of fiber.  · White rice (cooked) -- ½ cup (81.5 g) has 0.3 g of fiber.  Meats and other proteins  · Egg -- 1 large (50 g) has 0 g of fiber.  · Meat, poultry, or fish -- 3 oz (85 g) has 0 g of fiber.  Dairy  · Milk -- 1 cup or 8 fl oz (237 mL) has 0 g of fiber.  · Yogurt -- 1 cup (245 g) has 0 g of fiber.  The items listed above may not be a complete list of foods that are low in fiber. Actual amounts of fiber may be different depending on processing. Contact a dietitian for more information.  Summary  · Fiber is a substance that is found in plant foods, such as fruits, vegetables, whole grains, nuts, seeds, and beans.  · As part of your treatment and recovery plan, your health care provider may recommend that you eat foods that have specific amounts of dietary fiber.  This information is not intended to replace advice given to you by your health care provider. Make sure you discuss any questions you have with your health care provider.  Document Revised: 04/22/2021 Document Reviewed: 04/22/2021  Elsevier Patient Education © 2021 [employer]

## 2021-12-21 NOTE — PATIENT INSTRUCTIONS
Utilize fiber use, increase daily water consumption  If no improvement ok to add Miralax   Ok to utilize Miralax and adjust as needed    Fiber Content in Foods  Fiber is a substance that is found in plant foods, such as fruits, vegetables, whole grains, nuts, seeds, and beans. As part of your treatment and recovery plan, your health care provider may recommend that you eat foods that have specific amounts of dietary fiber. Some conditions may require a high-fiber diet while others may require a low-fiber diet.  This sheet gives you information about the dietary fiber content of some common foods. Your health care provider will tell you how much fiber you need in your diet. If you have problems or questions, contact your health care provider or dietitian.  What foods are high in fiber?    Fruits  · Blackberries or raspberries (fresh) -- ½ cup (75 g) has 4 g of fiber.  · Pear (fresh) -- 1 medium (180 g) has 5.5 g of fiber.  · Prunes (dried) -- 6 to 8 pieces (57-76 g) has 5 g of fiber.  · Apple with skin -- 1 medium (182 g) has 4.8 g of fiber.  · Guava -- 1 cup (128 g) has 8.9 g of fiber.  Vegetables  · Peas (frozen) -- ½ cup (80 g) has 4.4 g of fiber.  · Potato with skin (baked) -- 1 medium (173 g) has 4.4 g of fiber.  · Pumpkin (canned) -- ½ cup (122 g) has 5 g of fiber.  · [place] sprouts (cooked) -- ½ cup (78 g) has 4 g of fiber.  · Sweet potato -- ½ cup mashed (124 g) has 4 g of fiber.  · Winter squash -- 1 cup cooked (205 g) has 5.7 g of fiber.  Grains  · Bran cereal -- ½ cup (31 g) has 8.6 g of fiber.  · Bulgur (cooked) -- ½ cup (70 g) has 4 g of fiber.  · Quinoa (cooked) -- 1 cup (185 g) has 5.2 g of fiber.  · Popcorn -- 3 cups (375 g) popped has 5.8 g of fiber.  · Spaghetti, whole wheat -- 1 cup (140 g) has 6 g of fiber.  Meats and other proteins  · Gallego beans (cooked) -- ½ cup (90 g) has 7.7 g of fiber.  · Lentils (cooked) -- ½ cup (90 g) has 7.8 g of fiber.  · Kidney beans (canned) -- ½ cup (92.5 g) has  5.7 g of fiber.  · Soybeans (canned, frozen, or fresh) -- ½ cup (92.5 g) has 5.2 g of fiber.  · Baked beans, plain or vegetarian (canned) -- ½ cup (130 g) has 5.2 g of fiber.  · Garbanzo beans or chickpeas (canned) -- ½ cup (90 g) has 6.6 g of fiber.  · Black beans (cooked) -- ½ cup (86 g) has 7.5 g of fiber.  · White beans or navy beans (cooked) -- ½ cup (91 g) has 9.3 g of fiber.  The items listed above may not be a complete list of foods with high fiber. Actual amounts of fiber may be different depending on processing. Contact a dietitian for more information.  What foods are moderate in fiber?    Fruits  · Banana -- 1 medium (126 g) has 3.2 g of fiber.  · Melon -- 1 cup (155 g) has 1.4 g of fiber.  · Orange -- 1 small (154 g) has 3.7 g of fiber.  · Raisins -- ¼ cup (40 g) has 1.8 g of fiber.  · Applesauce, sweetened -- ½ cup (125 g) has 1.5 g of fiber.  · Blueberries (fresh) -- ½ cup (75 g) has 1.8 g of fiber.  · Strawberries (fresh, sliced) -- 1 cup (150 g) has 3 g of fiber.  · Cherries -- 1 cup (140 g) has 2.9 g of fiber.  Vegetables  · Broccoli (cooked) -- ½ cup (77.5 g) has 2.1 g of fiber.  · Carrots (cooked) -- ½ cup (77.5 g) has 2.2 g of fiber.  · Corn (canned or frozen) -- ½ cup (82.5 g) has 2.1 g of fiber.  · Potatoes, mashed -- ½ cup (105 g) has 1.6 g of fiber.  · Tomato -- 1 medium (62 g) has 1.5 g of fiber.  · Green beans (canned) -- ½ cup (83 g) has 2 g of fiber.  · Squash, winter -- ½ cup (58 g) has 1 g of fiber.  · Sweet potato, baked -- 1 medium (150 g) has 3 g of fiber.  · Cauliflower (cooked) -- 1/2 cup (90 g) has 2.3 g of fiber.  Grains  · Long-grain brown rice (cooked) -- 1 cup (196 g) has 3.5 g of fiber.  · Bagel, plain -- one 4-inch (10 cm) bagel has 2 g of fiber.  · Instant oatmeal -- ½ cup (120 g) has about 2 g of fiber.  · Macaroni noodles, enriched (cooked) -- 1 cup (140 g) has 2.5 g of fiber.  · Multigrain cereal -- ½ cup (15 g) has about 2-4 g of fiber.  · Whole-wheat bread -- 1 slice  (26 g) has 2 g of fiber.  · Whole-wheat spaghetti noodles -- ½ cup (70 g) has 3.2 g of fiber.  · Corn tortilla -- one 6-inch (15 cm) tortilla has 1.5 g of fiber.  Meats and other proteins  · Almonds -- ¼ cup or 1 oz (28 g) has 3.5 g of fiber.  · Sunflower seeds in shell -- ¼ cup or ½ oz (11.5 g) has 1.1 g of fiber.  · Vegetable or soy pushpa -- 1 pushpa (70 g) has 3.4 g of fiber.  · Walnuts -- ¼ cup or 1 oz (30 g) has 2 g of fiber.  · Flax seed -- 1 Tbsp (7 g) has 2.8 g of fiber.  The items listed above may not be a complete list of foods that have moderate amounts of fiber. Actual amounts of fiber may be different depending on processing. Contact a dietitian for more information.  What foods are low in fiber?    Low-fiber foods contain less than 1 g of fiber per serving. They include:  Fruits  · Fruit juice -- ½ cup or 4 fl oz (118 mL) has 0.5 g of fiber.  Vegetables  · Lettuce -- 1 cup (35 g) has 0.5 g of fiber.  · Cucumber (slices) -- ½ cup (60 g) has 0.3 g of fiber.  · Celery -- 1 stalk (40 g) has 0.1 g of fiber.  Grains  · Flour tortilla -- one 6-inch (15 cm) tortilla has 0.5 g of fiber.  · White rice (cooked) -- ½ cup (81.5 g) has 0.3 g of fiber.  Meats and other proteins  · Egg -- 1 large (50 g) has 0 g of fiber.  · Meat, poultry, or fish -- 3 oz (85 g) has 0 g of fiber.  Dairy  · Milk -- 1 cup or 8 fl oz (237 mL) has 0 g of fiber.  · Yogurt -- 1 cup (245 g) has 0 g of fiber.  The items listed above may not be a complete list of foods that are low in fiber. Actual amounts of fiber may be different depending on processing. Contact a dietitian for more information.  Summary  · Fiber is a substance that is found in plant foods, such as fruits, vegetables, whole grains, nuts, seeds, and beans.  · As part of your treatment and recovery plan, your health care provider may recommend that you eat foods that have specific amounts of dietary fiber.  This information is not intended to replace advice given to you by your  health care provider. Make sure you discuss any questions you have with your health care provider.  Document Revised: 04/22/2021 Document Reviewed: 04/22/2021  Elsevier Patient Education © 2021 [employer] Inc.

## 2022-01-31 ENCOUNTER — OFFICE VISIT (OUTPATIENT)
Dept: GASTROENTEROLOGY | Facility: CLINIC | Age: 72
End: 2022-01-31

## 2022-01-31 VITALS
SYSTOLIC BLOOD PRESSURE: 126 MMHG | HEIGHT: 72 IN | OXYGEN SATURATION: 99 % | BODY MASS INDEX: 29.39 KG/M2 | DIASTOLIC BLOOD PRESSURE: 70 MMHG | HEART RATE: 70 BPM | TEMPERATURE: 98 F | WEIGHT: 217 LBS

## 2022-01-31 DIAGNOSIS — R19.4 CHANGE IN BOWEL HABIT: Primary | ICD-10-CM

## 2022-01-31 PROCEDURE — 99213 OFFICE O/P EST LOW 20 MIN: CPT | Performed by: INTERNAL MEDICINE

## 2022-01-31 NOTE — PROGRESS NOTES
Chief Complaint   Patient presents with   • GI Problem     belching stomach hurting mif stomach       PCP: Oren Rivera MD  REFER: No ref. provider found    Subjective     HPI       Recently started on Fiber on it has seemed to help  Notes he thinks it could be related to his metformin  Denies weight loss  Denies BRIGHT RED BLOOD PER RECTUM       Past Medical History:   Diagnosis Date   • Abdominal pain    • Abnormal liver enzymes    • Colon polyps    • Constipation    • Diabetes mellitus (HCC)    • Diarrhea    • GERD (gastroesophageal reflux disease)    • History of rectal bleeding    • Hyperlipidemia    • Hypertension    • Insomnia        Past Surgical History:   Procedure Laterality Date   • CHOLECYSTECTOMY     • COLONOSCOPY  06/04/2012    tubulobillous adenoma 3 year   • COLONOSCOPY  06/07/2010    multiple polypecytomies - see path -- 2 yr   • COLONOSCOPY  04/06/2004   • COLONOSCOPY N/A 4/6/2021    Procedure: COLONOSCOPY WITH ANESTHESIA;  Surgeon: Lopez Serna DO;  Location: [facility] ENDOSCOPY;  Service: Gastroenterology;  Laterality: N/A;  pre hx adenomatous colon polyp  post diverticulosis; polyps   Oren Rivera MD   • ENDOSCOPY  02/16/2007   • ENDOSCOPY N/A 4/6/2021    Procedure: ESOPHAGOGASTRODUODENOSCOPY WITH ANESTHESIA;  Surgeon: Lopez Serna DO;  Location: [facility] ENDOSCOPY;  Service: Gastroenterology;  Laterality: N/A;  pre heartburn  post normal  Oren Rivera MD   • EYE SURGERY Bilateral     cataract   • HEMORRHOIDECTOMY     • KNEE ARTHROSCOPY         Outpatient Medications Marked as Taking for the 1/31/22 encounter (Office Visit) with Lopez Serna DO   Medication Sig Dispense Refill   • amLODIPine (NORVASC) 5 MG tablet Take 5 mg by mouth Daily.     • aspirin 325 MG tablet Take 325 mg by mouth Daily.     • aspirin 81 MG EC tablet Take 81 mg by mouth Daily.     • Coenzyme Q10 (COQ10 PO) Take 10 mg by mouth.     • esomeprazole (nexIUM) 20 MG capsule Take 1 capsule by  "mouth Every Morning Before Breakfast. 90 capsule 3   • glimepiride (AMARYL) 2 MG tablet Take 2 mg by mouth 2 (Two) Times a Day.     • HYDROcodone-acetaminophen (NORCO) 7.5-325 MG per tablet Take 1 tablet by mouth Every 6 (Six) Hours As Needed for moderate pain (4-6).     • metFORMIN (GLUCOPHAGE) 500 MG tablet Take 1,000 mg by mouth 2 (Two) Times a Day With Meals.     • Metoprolol Succinate 100 MG capsule extended-release 24 hour sprinkle Take  by mouth.     • polyethylene glycol-electrolytes (NULYTELY WITH FLAVOR PACKS) 420 G solution Take 4,000 mL by mouth 1 (One) Time.     • ramipril (ALTACE) 5 MG capsule Take 5 mg by mouth Daily.     • rosuvastatin (CRESTOR) 10 MG tablet Take 10 mg by mouth Daily.     • zolpidem (AMBIEN) 10 MG tablet Take 10 mg by mouth At Night As Needed for sleep.         Allergies   Allergen Reactions   • Demerol [Meperidine] Confusion       Social History     Socioeconomic History   • Marital status:    Tobacco Use   • Smoking status: Never Smoker   • Smokeless tobacco: Never Used   Vaping Use   • Vaping Use: Never used   Substance and Sexual Activity   • Alcohol use: Yes     Comment: not very often   • Drug use: No   • Sexual activity: Defer       Review of Systems   Constitutional: Negative for unexpected weight change.   Respiratory: Negative for shortness of breath.    Cardiovascular: Negative for chest pain.   Gastrointestinal: Negative for abdominal pain and anal bleeding.       Objective     Vitals:    01/31/22 1415   BP: 126/70   Pulse: 70   Temp: 98 °F (36.7 °C)   SpO2: 99%   Weight: 98.4 kg (217 lb)   Height: 182.9 cm (72\")     Body mass index is 29.43 kg/m².    Physical Exam  Constitutional:       Appearance: Normal appearance. He is well-developed.   Eyes:      General: No scleral icterus.  Neck:      Thyroid: No thyroid mass or thyromegaly.      Vascular: No JVD.   Pulmonary:      Effort: Pulmonary effort is normal. No accessory muscle usage.   Abdominal:      General: " There is no distension.      Tenderness: There is no abdominal tenderness. There is no guarding.   Skin:     Coloration: Skin is not jaundiced.   Neurological:      Mental Status: He is alert.   Psychiatric:         Behavior: Behavior is cooperative.         Judgment: Judgment normal.         Imaging Results (Most Recent)     None          Body mass index is 29.43 kg/m².    Assessment/Plan     Diagnoses and all orders for this visit:    1. Change in bowel habit (Primary)      He will f/u prn  Take fiber  Maybe he can decrease or get off his metformin    * Surgery not found *        Advised pt to stop use of NSAIDs, Fish Oil, and MV 5 days prior to procedure, per Dr Serna protocol.  Tylenol based products are ok to take.  Pt verbalized understanding.    Precautions are currently being put in place due to COVID-19.  I have explained to Sundar Rojas they will be required to undergo COVID testing prior to their procedure.  Sundar Rojas verbalized understanding and was willing to proceed.        Lopez Serna, DO  01/31/22          There are no Patient Instructions on file for this visit.

## 2022-07-11 ENCOUNTER — TRANSCRIBE ORDERS (OUTPATIENT)
Dept: ADMINISTRATIVE | Facility: HOSPITAL | Age: 72
End: 2022-07-11

## 2022-07-11 DIAGNOSIS — R00.1 BRADYCARDIA: Primary | ICD-10-CM

## 2022-07-13 ENCOUNTER — HOSPITAL ENCOUNTER (OUTPATIENT)
Dept: CARDIOLOGY | Facility: HOSPITAL | Age: 72
Discharge: HOME | End: 2022-07-13
Admitting: NURSE PRACTITIONER

## 2022-07-13 DIAGNOSIS — R00.1 BRADYCARDIA: ICD-10-CM

## 2022-07-13 PROCEDURE — 93225 XTRNL ECG REC<48 HRS REC: CPT

## 2022-07-19 LAB
MAXIMAL PREDICTED HEART RATE: 149 BPM
STRESS TARGET HR: 127 BPM

## 2022-07-19 PROCEDURE — 93227 XTRNL ECG REC<48 HR R&I: CPT | Performed by: INTERNAL MEDICINE

## 2023-08-30 ENCOUNTER — OFFICE VISIT (OUTPATIENT)
Dept: WOUND CARE | Facility: HOSPITAL | Age: 73
End: 2023-08-30
Payer: MEDICARE

## 2023-08-30 PROCEDURE — G0463 HOSPITAL OUTPT CLINIC VISIT: HCPCS

## 2023-09-05 ENCOUNTER — OFFICE VISIT (OUTPATIENT)
Dept: BARIATRICS/WEIGHT MGMT | Facility: CLINIC | Age: 73
End: 2023-09-05
Payer: MEDICARE

## 2023-09-05 VITALS
TEMPERATURE: 98.4 F | WEIGHT: 211.4 LBS | OXYGEN SATURATION: 96 % | BODY MASS INDEX: 28.63 KG/M2 | HEIGHT: 72 IN | HEART RATE: 71 BPM | SYSTOLIC BLOOD PRESSURE: 158 MMHG | DIASTOLIC BLOOD PRESSURE: 72 MMHG

## 2023-09-05 DIAGNOSIS — L72.3 SEBACEOUS CYST: Primary | ICD-10-CM

## 2023-09-05 PROCEDURE — 99203 OFFICE O/P NEW LOW 30 MIN: CPT | Performed by: SURGERY

## 2023-09-05 PROCEDURE — 1160F RVW MEDS BY RX/DR IN RCRD: CPT | Performed by: SURGERY

## 2023-09-05 PROCEDURE — 1159F MED LIST DOCD IN RCRD: CPT | Performed by: SURGERY

## 2023-09-05 RX ORDER — PERPHENAZINE 16 MG/1
1 TABLET, FILM COATED ORAL AS NEEDED
COMMUNITY
Start: 2023-05-18

## 2023-09-05 RX ORDER — SEMAGLUTIDE 0.68 MG/ML
INJECTION, SOLUTION SUBCUTANEOUS WEEKLY
COMMUNITY

## 2023-09-05 RX ORDER — COLCHICINE 0.6 MG/1
0.6 TABLET ORAL DAILY
COMMUNITY

## 2023-09-05 RX ORDER — METOPROLOL SUCCINATE 25 MG/1
1 TABLET, EXTENDED RELEASE ORAL DAILY
COMMUNITY

## 2023-09-05 RX ORDER — GLIMEPIRIDE 4 MG/1
1 TABLET ORAL 2 TIMES DAILY
COMMUNITY

## 2023-09-05 RX ORDER — OLMESARTAN MEDOXOMIL AND HYDROCHLOROTHIAZIDE 40/12.5 40; 12.5 MG/1; MG/1
1 TABLET ORAL DAILY
COMMUNITY

## 2023-09-05 NOTE — PROGRESS NOTES
Office New Patient History and Physical:     Referring Provider: Jimena Riggins APRN    Chief Complaint   Patient presents with    Consult     Possible cyst on bottom of buttocks        Subjective .     History of present illness:  Sundar Rojas is a 72 y.o. male approximately 2 months of complaints of a sebaceous cyst.  He was seen by his primary care provider and placed on antibiotics.  This has helped with respect to his size and tenderness however it still present and palpable.  He was instructed to follow-up with general surgery to be assessed and possible lancing of this area.  Patient does have a history of diabetes and hypertension.    History  Past Medical History:   Diagnosis Date    Abdominal pain     Abnormal liver enzymes     Colon polyps     Constipation     Diabetes mellitus     Diarrhea     GERD (gastroesophageal reflux disease)     History of rectal bleeding     Hyperlipidemia     Hypertension     Insomnia    ,   Past Surgical History:   Procedure Laterality Date    CHOLECYSTECTOMY      COLONOSCOPY  06/04/2012    tubulobillous adenoma 3 year    COLONOSCOPY  06/07/2010    multiple polypecytomies - see path -- 2 yr    COLONOSCOPY  04/06/2004    COLONOSCOPY N/A 4/6/2021    Procedure: COLONOSCOPY WITH ANESTHESIA;  Surgeon: Lopez Serna DO;  Location: [facility] ENDOSCOPY;  Service: Gastroenterology;  Laterality: N/A;  pre hx adenomatous colon polyp  post diverticulosis; polyps   Oren Rivera MD    ENDOSCOPY  02/16/2007    ENDOSCOPY N/A 4/6/2021    Procedure: ESOPHAGOGASTRODUODENOSCOPY WITH ANESTHESIA;  Surgeon: Lopez Serna DO;  Location: [facility] ENDOSCOPY;  Service: Gastroenterology;  Laterality: N/A;  pre heartburn  post normal  Oren Rivera MD    EYE SURGERY Bilateral     cataract    HEMORRHOIDECTOMY      KNEE ARTHROSCOPY     ,   Family History   Problem Relation Age of Onset    Anal fissures Father     Colon cancer Neg Hx     Colon polyps Neg Hx    ,   Social History  "    Tobacco Use    Smoking status: Never    Smokeless tobacco: Never   Vaping Use    Vaping Use: Never used   Substance Use Topics    Alcohol use: Yes     Comment: not very often    Drug use: No   , (Not in a hospital admission)   and Allergies:  Demerol [meperidine], Canagliflozin, and Dulaglutide    Current Outpatient Medications:     amLODIPine (NORVASC) 5 MG tablet, Take 1 tablet by mouth Daily., Disp: , Rfl:     aspirin 81 MG EC tablet, Take 1 tablet by mouth Daily., Disp: , Rfl:     Coenzyme Q10 (COQ10 PO), Take 10 mg by mouth., Disp: , Rfl:     colchicine 0.6 MG tablet, Take 1 tablet by mouth Daily., Disp: , Rfl:     Contour Next Test test strip, 1 each As Needed., Disp: , Rfl:     esomeprazole (nexIUM) 20 MG capsule, Take 1 capsule by mouth Every Morning Before Breakfast., Disp: 90 capsule, Rfl: 3    glimepiride (AMARYL) 4 MG tablet, Take 1 tablet by mouth 2 (Two) Times a Day., Disp: , Rfl:     metFORMIN (GLUCOPHAGE) 1000 MG tablet, Take 1 tablet by mouth 2 (Two) Times a Day., Disp: , Rfl:     metoprolol succinate XL (TOPROL-XL) 25 MG 24 hr tablet, Take 1 tablet by mouth Daily., Disp: , Rfl:     olmesartan-hydrochlorothiazide (BENICAR HCT) 40-12.5 MG per tablet, Take 1 tablet by mouth Daily., Disp: , Rfl:     Ozempic, 0.25 or 0.5 MG/DOSE, 2 MG/3ML solution pen-injector, Inject  under the skin into the appropriate area as directed 1 (One) Time Per Week., Disp: , Rfl:     rosuvastatin (CRESTOR) 10 MG tablet, Take 1 tablet by mouth Daily., Disp: , Rfl:     zolpidem (AMBIEN) 10 MG tablet, Take 1 tablet by mouth At Night As Needed for Sleep., Disp: , Rfl:     Objective     Vital Signs   /72 (BP Location: Right arm, Patient Position: Sitting, Cuff Size: Adult)   Pulse 71   Temp 98.4 °F (36.9 °C)   Ht 182.9 cm (72\")   Wt 95.9 kg (211 lb 6.4 oz)   SpO2 96%   BMI 28.67 kg/m²      Physical Exam:    Physical Exam  Skin:            Comments: Area of s. Cyst. Right buttocks.       Results Review:    Result " Review :             Assessment & Plan       Diagnoses and all orders for this visit:    1. Sebaceous cyst (Primary)    Plan: Patient was consented for incision and drainage of sebaceous cyst.  He was explained the risks which include bleeding, infection which is increased secondary to diabetes.  Procedure in detail: Incision and drainage of sebaceous cyst right buttocks  Preoperative diagnosis sebaceous cyst right buttocks postop same procedure in detail after patient was explained alternatives, benefits, complications risk patient provided informed consent.  This was signed.  Surgical timeout was performed.  I then prepped the area of his right buttocks over the lesion with iodine.  I then numbed it with/locally anesthetized the area with lidocaine.  I then draped the area appropriately for the procedure.  I utilized an 11 blade after locally anesthetized the area and incised a cruciate incision over the lesion.  I evacuated the sebaceous cyst and contents.  The patient tolerated the procedure well.  I then packed the area loosely with peroxide soaked Nu Gauze.  The patient tolerated this well place a dressing over the area and secured with paper tape.  Patient was instructed to daily remove the packing after soaking it with peroxide and then repacking it accordingly with Nu Gauze soaked with peroxide daily and covering with the dressing.  All instruments were accounted for prior to dressing wound site.  Disposition stable he is to follow-up with me in 1 week's time or as needed if signs of infection which were explained to the patient present.    Richardson Disla MD  09/05/23  15:14 CDT

## 2023-09-07 ENCOUNTER — OFFICE VISIT (OUTPATIENT)
Dept: WOUND CARE | Facility: HOSPITAL | Age: 73
End: 2023-09-07
Payer: MEDICARE

## 2023-09-07 PROCEDURE — G0463 HOSPITAL OUTPT CLINIC VISIT: HCPCS

## 2023-09-12 ENCOUNTER — OFFICE VISIT (OUTPATIENT)
Dept: BARIATRICS/WEIGHT MGMT | Facility: CLINIC | Age: 73
End: 2023-09-12
Payer: MEDICARE

## 2023-09-12 VITALS
HEIGHT: 72 IN | WEIGHT: 211.4 LBS | SYSTOLIC BLOOD PRESSURE: 154 MMHG | TEMPERATURE: 97.8 F | HEART RATE: 97 BPM | OXYGEN SATURATION: 97 % | DIASTOLIC BLOOD PRESSURE: 82 MMHG | BODY MASS INDEX: 28.63 KG/M2

## 2023-09-12 DIAGNOSIS — L72.3 SEBACEOUS CYST: Primary | ICD-10-CM

## 2023-09-12 PROCEDURE — 1159F MED LIST DOCD IN RCRD: CPT | Performed by: SURGERY

## 2023-09-12 PROCEDURE — 1160F RVW MEDS BY RX/DR IN RCRD: CPT | Performed by: SURGERY

## 2023-09-12 PROCEDURE — 99024 POSTOP FOLLOW-UP VISIT: CPT | Performed by: SURGERY

## 2023-09-12 NOTE — PROGRESS NOTES
"  Patient Care Team:  Oren Rivera MD as PCP - General (Internal Medicine)  Lopez Serna DO as Consulting Physician (Gastroenterology)    Subjective     Sundar Rojas is a 72 y.o. male.     Sundar is post op excision and drainage of buttock cyst.  He is currently improving with less symptoms of pain in his buttocks region.  He would no longer requires packing for his wound site.  He states there is minimal drainage as well.      Review Of Systems:  General ROS: negative    The following portions of the patient's history were reviewed and updated as appropriate: allergies, current medications, past family history, past medical history, past social history, past surgical history, and problem list.    Objective   /82 (BP Location: Right arm, Patient Position: Sitting, Cuff Size: Adult)   Pulse 97   Temp 97.8 °F (36.6 °C)   Ht 182.9 cm (72\")   Wt 95.9 kg (211 lb 6.4 oz)   SpO2 97%   BMI 28.67 kg/m²       09/12/23  1128   Weight: 95.9 kg (211 lb 6.4 oz)        General Appearance:  awake, alert, oriented, in no acute distress  Rectal: See wound  Wounds: clean, dry, intact    ASSESSMENT/ PLAN    Encounter Diagnoses   Name Primary?    Sebaceous cyst Yes     She is to continue with daily dressing changes until the area is healed from secondary intentions.  He was instructed to follow-up with me if this recurs with symptoms of pain, fevers chills or drainage.  09/12/23  13:34 CDT  Patient Care Team:  Oren Rivera MD as PCP - General (Internal Medicine)  Lopez Serna DO as Consulting Physician (Gastroenterology)  Richardson Disla MD FACS    "

## 2024-03-04 ENCOUNTER — OFFICE VISIT (OUTPATIENT)
Dept: GASTROENTEROLOGY | Facility: CLINIC | Age: 74
End: 2024-03-04
Payer: MEDICARE

## 2024-03-04 VITALS
OXYGEN SATURATION: 97 % | SYSTOLIC BLOOD PRESSURE: 148 MMHG | WEIGHT: 219 LBS | BODY MASS INDEX: 29.66 KG/M2 | HEIGHT: 72 IN | HEART RATE: 72 BPM | DIASTOLIC BLOOD PRESSURE: 80 MMHG | TEMPERATURE: 97.6 F

## 2024-03-04 DIAGNOSIS — R10.32 LEFT LOWER QUADRANT ABDOMINAL PAIN: Primary | ICD-10-CM

## 2024-03-04 PROBLEM — R19.4 CHANGE IN BOWEL HABIT: Status: RESOLVED | Noted: 2022-01-31 | Resolved: 2024-03-04

## 2024-03-04 PROCEDURE — 1159F MED LIST DOCD IN RCRD: CPT | Performed by: INTERNAL MEDICINE

## 2024-03-04 PROCEDURE — 99214 OFFICE O/P EST MOD 30 MIN: CPT | Performed by: INTERNAL MEDICINE

## 2024-03-04 PROCEDURE — 1160F RVW MEDS BY RX/DR IN RCRD: CPT | Performed by: INTERNAL MEDICINE

## 2024-03-04 RX ORDER — SEMAGLUTIDE 0.68 MG/ML
INJECTION, SOLUTION SUBCUTANEOUS
COMMUNITY
Start: 2024-01-15

## 2024-03-04 RX ORDER — IBUPROFEN 200 MG
200 TABLET ORAL EVERY 6 HOURS PRN
COMMUNITY

## 2024-03-04 NOTE — PROGRESS NOTES
Chief Complaint   Patient presents with    Abdominal Pain     After he eats has pain in left back and side and goes around to his front is having lots of bloating       PCP: Oren Rivera MD  REFER: No ref. provider found    Subjective     HPI    Sundar Rojas complains of intermittent burning pain that starts in left lower back with radiation into front lower abdomen.  Eating exacerbates discomfort.  Pain noted more if he sits after eating.   Pain present for several months.  No changes to bowels.  No signs of GI blood loss.  He has frequent abdominal bloating.  Bowels move 3 times per day but described as small.  Tried Ozempic but stopped due to GI related side effects (he stopped 8 days ago).   No difficulty with urination.      Past Medical History:   Diagnosis Date    Abdominal pain     Abnormal liver enzymes     Colon polyps     Constipation     Diabetes mellitus     Diarrhea     GERD (gastroesophageal reflux disease)     History of rectal bleeding     Hyperlipidemia     Hypertension     Insomnia        Past Surgical History:   Procedure Laterality Date    CHOLECYSTECTOMY      COLONOSCOPY  06/04/2012    tubulobillous adenoma 3 year    COLONOSCOPY  06/07/2010    multiple polypecytomies - see path -- 2 yr    COLONOSCOPY  04/06/2004    COLONOSCOPY N/A 4/6/2021    Procedure: COLONOSCOPY WITH ANESTHESIA;  Surgeon: Lopez Serna DO;  Location: [facility] ENDOSCOPY;  Service: Gastroenterology;  Laterality: N/A;  pre hx adenomatous colon polyp  post diverticulosis; polyps   Oren Rivera MD    ENDOSCOPY  02/16/2007    ENDOSCOPY N/A 4/6/2021    Procedure: ESOPHAGOGASTRODUODENOSCOPY WITH ANESTHESIA;  Surgeon: Lopez Serna DO;  Location: [facility] ENDOSCOPY;  Service: Gastroenterology;  Laterality: N/A;  pre heartburn  post normal  Oren Rivera MD    EYE SURGERY Bilateral     cataract    HEMORRHOIDECTOMY      KNEE ARTHROSCOPY         Outpatient Medications Marked as Taking for the 3/4/24  "encounter (Office Visit) with Lopez Serna, DO   Medication Sig Dispense Refill    amLODIPine (NORVASC) 5 MG tablet Take 1 tablet by mouth Daily.      aspirin 81 MG EC tablet Take 1 tablet by mouth Daily.      esomeprazole (nexIUM) 20 MG capsule Take 1 capsule by mouth Every Morning Before Breakfast. 90 capsule 3    glimepiride (AMARYL) 4 MG tablet Take 1 tablet by mouth 2 (Two) Times a Day.      ibuprofen (ADVIL,MOTRIN) 200 MG tablet Take 1 tablet by mouth Every 6 (Six) Hours As Needed for Mild Pain.      metFORMIN (GLUCOPHAGE) 1000 MG tablet Take 1 tablet by mouth 2 (Two) Times a Day.      metoprolol succinate XL (TOPROL-XL) 25 MG 24 hr tablet Take 1 tablet by mouth Daily.      olmesartan-hydrochlorothiazide (BENICAR HCT) 40-12.5 MG per tablet Take 1 tablet by mouth Daily.      Ozempic, 0.25 or 0.5 MG/DOSE, 2 MG/3ML solution pen-injector INJECT 0.5 MG SUBCUTANEOUSLY EVERY WEEK      rosuvastatin (CRESTOR) 10 MG tablet Take 1 tablet by mouth Daily.      zolpidem (AMBIEN) 10 MG tablet Take 1 tablet by mouth At Night As Needed for Sleep.         Allergies   Allergen Reactions    Demerol [Meperidine] Confusion    Canagliflozin Rash    Dulaglutide Rash       Social History     Socioeconomic History    Marital status:    Tobacco Use    Smoking status: Never    Smokeless tobacco: Never   Vaping Use    Vaping status: Never Used   Substance and Sexual Activity    Alcohol use: Yes     Comment: not very often    Drug use: No    Sexual activity: Defer       Review of Systems   Constitutional:  Negative for fever and unexpected weight change.   HENT:  Negative for trouble swallowing.    Respiratory:  Negative for shortness of breath.    Cardiovascular:  Negative for chest pain.   Gastrointestinal:  Positive for abdominal pain. Negative for anal bleeding.       Objective     Vitals:    03/04/24 1254   BP: 148/80   Pulse: 72   Temp: 97.6 °F (36.4 °C)   SpO2: 97%   Weight: 99.3 kg (219 lb)   Height: 182.9 cm (72\") "     Body mass index is 29.7 kg/m².    Physical Exam  Constitutional:       Appearance: Normal appearance. He is well-developed.   Eyes:      General: No scleral icterus.  Cardiovascular:      Heart sounds: Normal heart sounds. No murmur heard.  Pulmonary:      Effort: Pulmonary effort is normal.   Abdominal:      General: Bowel sounds are normal. There is no distension.      Palpations: Abdomen is soft.      Tenderness: There is no abdominal tenderness. There is no guarding.   Skin:     General: Skin is warm and dry.      Coloration: Skin is not jaundiced.   Neurological:      Mental Status: He is alert.   Psychiatric:         Behavior: Behavior is cooperative.         Imaging Results (Most Recent)       None            Body mass index is 29.7 kg/m².    Assessment & Plan     Diagnoses and all orders for this visit:    1. Left lower quadrant abdominal pain (Primary)  -     Case Request; Standing  -     Implement Anesthesia Orders Day of Procedure; Standing  -     Obtain Informed Consent; Standing  -     Case Request        COLONOSCOPY WITH ANESTHESIA (N/A)    Miralax prep    ? If symptoms related to side effect of Ozempic, he stopped one week ago  Colonoscopy complete 2021 and was unremarkable at that time.  Colonoscopy offered for evaluation of current symptoms  vs waiting, improving bowel movements.   Remain off ozempic, if symptoms persist consider repeat colonoscopy/further imaging.  Discussed possible role of anxiety    Ok to take 1/2 bottle mag citrate - 1 bottle of mag citrate or take 4-5 doses of miralax to initiate bowel movement --if he wishes to work on bowel movement prior to colonoscopy evaluation.      To help improve bowel movements: increase dietary fiber, increase physical activity, use miralax or MOM on reg basis    Sundar Rojas wishes to proceed with colonoscopy.  Sundar Rojas was advised to start taking miralax on daily basis now (adjust dose as needed to facilitate bm).  Proceed with miralax  prep.      Lopez Serna,   03/04/24          Patient Instructions   Fiber Content in Foods  Fiber is a substance that is found in plant foods, such as fruits, vegetables, whole grains, nuts, seeds, and beans. As part of your treatment and recovery plan, your health care provider may recommend that you eat foods that have specific amounts of dietary fiber. Some conditions may require a high-fiber diet while others may require a low-fiber diet.  This sheet gives you information about the dietary fiber content of some common foods. Your health care provider will tell you how much fiber you need in your diet. If you have problems or questions, contact your health care provider or dietitian.  What foods are high in fiber?    Fruits  Blackberries or raspberries (fresh) -- ½ cup (75 g) has 4 g of fiber.  Pear (fresh) -- 1 medium (180 g) has 5.5 g of fiber.  Prunes (dried) -- 6 to 8 pieces (57-76 g) has 5 g of fiber.  Apple with skin -- 1 medium (182 g) has 4.8 g of fiber.  Guava -- 1 cup (128 g) has 8.9 g of fiber.  Vegetables  Peas (frozen) -- ½ cup (80 g) has 4.4 g of fiber.  Potato with skin (baked) -- 1 medium (173 g) has 4.4 g of fiber.  Pumpkin (canned) -- ½ cup (122 g) has 5 g of fiber.  [place] sprouts (cooked) -- ½ cup (78 g) has 4 g of fiber.  Sweet potato -- ½ cup mashed (124 g) has 4 g of fiber.  Winter squash -- 1 cup cooked (205 g) has 5.7 g of fiber.  Grains  Bran cereal -- ½ cup (31 g) has 8.6 g of fiber.  Bulgur (cooked) -- ½ cup (70 g) has 4 g of fiber.  Quinoa (cooked) -- 1 cup (185 g) has 5.2 g of fiber.  Popcorn -- 3 cups (375 g) popped has 5.8 g of fiber.  Spaghetti, whole wheat -- 1 cup (140 g) has 6 g of fiber.  Meats and other proteins  Gallego beans (cooked) -- ½ cup (90 g) has 7.7 g of fiber.  Lentils (cooked) -- ½ cup (90 g) has 7.8 g of fiber.  Kidney beans (canned) -- ½ cup (92.5 g) has 5.7 g of fiber.  Soybeans (canned, frozen, or fresh) -- ½ cup (92.5 g) has 5.2 g of fiber.  Baked  beans, plain or vegetarian (canned) -- ½ cup (130 g) has 5.2 g of fiber.  Garbanzo beans or chickpeas (canned) -- ½ cup (90 g) has 6.6 g of fiber.  Black beans (cooked) -- ½ cup (86 g) has 7.5 g of fiber.  White beans or navy beans (cooked) -- ½ cup (91 g) has 9.3 g of fiber.  The items listed above may not be a complete list of foods with high fiber. Actual amounts of fiber may be different depending on processing. Contact a dietitian for more information.  What foods are moderate in fiber?    Fruits  Banana -- 1 medium (126 g) has 3.2 g of fiber.  Melon -- 1 cup (155 g) has 1.4 g of fiber.  Orange -- 1 small (154 g) has 3.7 g of fiber.  Raisins -- ¼ cup (40 g) has 1.8 g of fiber.  Applesauce, sweetened -- ½ cup (125 g) has 1.5 g of fiber.  Blueberries (fresh) -- ½ cup (75 g) has 1.8 g of fiber.  Strawberries (fresh, sliced) -- 1 cup (150 g) has 3 g of fiber.  Cherries -- 1 cup (140 g) has 2.9 g of fiber.  Vegetables  Broccoli (cooked) -- ½ cup (77.5 g) has 2.1 g of fiber.  Carrots (cooked) -- ½ cup (77.5 g) has 2.2 g of fiber.  Corn (canned or frozen) -- ½ cup (82.5 g) has 2.1 g of fiber.  Potatoes, mashed -- ½ cup (105 g) has 1.6 g of fiber.  Tomato -- 1 medium (62 g) has 1.5 g of fiber.  Green beans (canned) -- ½ cup (83 g) has 2 g of fiber.  Squash, winter -- ½ cup (58 g) has 1 g of fiber.  Sweet potato, baked -- 1 medium (150 g) has 3 g of fiber.  Cauliflower (cooked) -- 1/2 cup (90 g) has 2.3 g of fiber.  Grains  Long-grain brown rice (cooked) -- 1 cup (196 g) has 3.5 g of fiber.  Bagel, plain -- one 4-inch (10 cm) bagel has 2 g of fiber.  Instant oatmeal -- ½ cup (120 g) has about 2 g of fiber.  Macaroni noodles, enriched (cooked) -- 1 cup (140 g) has 2.5 g of fiber.  Multigrain cereal -- ½ cup (15 g) has about 2-4 g of fiber.  Whole-wheat bread -- 1 slice (26 g) has 2 g of fiber.  Whole-wheat spaghetti noodles -- ½ cup (70 g) has 3.2 g of fiber.  Corn tortilla -- one 6-inch (15 cm) tortilla has 1.5 g of  fiber.  Meats and other proteins  Almonds -- ¼ cup or 1 oz (28 g) has 3.5 g of fiber.  Sunflower seeds in shell -- ¼ cup or ½ oz (11.5 g) has 1.1 g of fiber.  Vegetable or soy pushpa -- 1 pushpa (70 g) has 3.4 g of fiber.  Walnuts -- ¼ cup or 1 oz (30 g) has 2 g of fiber.  Flax seed -- 1 Tbsp (7 g) has 2.8 g of fiber.  The items listed above may not be a complete list of foods that have moderate amounts of fiber. Actual amounts of fiber may be different depending on processing. Contact a dietitian for more information.  What foods are low in fiber?    Low-fiber foods contain less than 1 g of fiber per serving. They include:  Fruits  Fruit juice -- ½ cup or 4 fl oz (118 mL) has 0.5 g of fiber.  Vegetables  Lettuce -- 1 cup (35 g) has 0.5 g of fiber.  Cucumber (slices) -- ½ cup (60 g) has 0.3 g of fiber.  Celery -- 1 stalk (40 g) has 0.1 g of fiber.  Grains  Flour tortilla -- one 6-inch (15 cm) tortilla has 0.5 g of fiber.  White rice (cooked) -- ½ cup (81.5 g) has 0.3 g of fiber.  Meats and other proteins  Egg -- 1 large (50 g) has 0 g of fiber.  Meat, poultry, or fish -- 3 oz (85 g) has 0 g of fiber.  Dairy  Milk -- 1 cup or 8 fl oz (237 mL) has 0 g of fiber.  Yogurt -- 1 cup (245 g) has 0 g of fiber.  The items listed above may not be a complete list of foods that are low in fiber. Actual amounts of fiber may be different depending on processing. Contact a dietitian for more information.  Summary  Fiber is a substance that is found in plant foods, such as fruits, vegetables, whole grains, nuts, seeds, and beans.  As part of your treatment and recovery plan, your health care provider may recommend that you eat foods that have specific amounts of dietary fiber.  This information is not intended to replace advice given to you by your health care provider. Make sure you discuss any questions you have with you

## 2024-03-04 NOTE — H&P (VIEW-ONLY)
Chief Complaint   Patient presents with    Abdominal Pain     After he eats has pain in left back and side and goes around to his front is having lots of bloating       PCP: Oren Rivera MD  REFER: No ref. provider found    Subjective     HPI    Sundar Rojas complains of intermittent burning pain that starts in left lower back with radiation into front lower abdomen.  Eating exacerbates discomfort.  Pain noted more if he sits after eating.   Pain present for several months.  No changes to bowels.  No signs of GI blood loss.  He has frequent abdominal bloating.  Bowels move 3 times per day but described as small.  Tried Ozempic but stopped due to GI related side effects (he stopped 8 days ago).   No difficulty with urination.      Past Medical History:   Diagnosis Date    Abdominal pain     Abnormal liver enzymes     Colon polyps     Constipation     Diabetes mellitus     Diarrhea     GERD (gastroesophageal reflux disease)     History of rectal bleeding     Hyperlipidemia     Hypertension     Insomnia        Past Surgical History:   Procedure Laterality Date    CHOLECYSTECTOMY      COLONOSCOPY  06/04/2012    tubulobillous adenoma 3 year    COLONOSCOPY  06/07/2010    multiple polypecytomies - see path -- 2 yr    COLONOSCOPY  04/06/2004    COLONOSCOPY N/A 4/6/2021    Procedure: COLONOSCOPY WITH ANESTHESIA;  Surgeon: Lopez Serna DO;  Location: [facility] ENDOSCOPY;  Service: Gastroenterology;  Laterality: N/A;  pre hx adenomatous colon polyp  post diverticulosis; polyps   Oren Rivera MD    ENDOSCOPY  02/16/2007    ENDOSCOPY N/A 4/6/2021    Procedure: ESOPHAGOGASTRODUODENOSCOPY WITH ANESTHESIA;  Surgeon: Lopez Serna DO;  Location: [facility] ENDOSCOPY;  Service: Gastroenterology;  Laterality: N/A;  pre heartburn  post normal  Oren Rivera MD    EYE SURGERY Bilateral     cataract    HEMORRHOIDECTOMY      KNEE ARTHROSCOPY         Outpatient Medications Marked as Taking for the 3/4/24  "encounter (Office Visit) with Lopez Serna, DO   Medication Sig Dispense Refill    amLODIPine (NORVASC) 5 MG tablet Take 1 tablet by mouth Daily.      aspirin 81 MG EC tablet Take 1 tablet by mouth Daily.      esomeprazole (nexIUM) 20 MG capsule Take 1 capsule by mouth Every Morning Before Breakfast. 90 capsule 3    glimepiride (AMARYL) 4 MG tablet Take 1 tablet by mouth 2 (Two) Times a Day.      ibuprofen (ADVIL,MOTRIN) 200 MG tablet Take 1 tablet by mouth Every 6 (Six) Hours As Needed for Mild Pain.      metFORMIN (GLUCOPHAGE) 1000 MG tablet Take 1 tablet by mouth 2 (Two) Times a Day.      metoprolol succinate XL (TOPROL-XL) 25 MG 24 hr tablet Take 1 tablet by mouth Daily.      olmesartan-hydrochlorothiazide (BENICAR HCT) 40-12.5 MG per tablet Take 1 tablet by mouth Daily.      Ozempic, 0.25 or 0.5 MG/DOSE, 2 MG/3ML solution pen-injector INJECT 0.5 MG SUBCUTANEOUSLY EVERY WEEK      rosuvastatin (CRESTOR) 10 MG tablet Take 1 tablet by mouth Daily.      zolpidem (AMBIEN) 10 MG tablet Take 1 tablet by mouth At Night As Needed for Sleep.         Allergies   Allergen Reactions    Demerol [Meperidine] Confusion    Canagliflozin Rash    Dulaglutide Rash       Social History     Socioeconomic History    Marital status:    Tobacco Use    Smoking status: Never    Smokeless tobacco: Never   Vaping Use    Vaping status: Never Used   Substance and Sexual Activity    Alcohol use: Yes     Comment: not very often    Drug use: No    Sexual activity: Defer       Review of Systems   Constitutional:  Negative for fever and unexpected weight change.   HENT:  Negative for trouble swallowing.    Respiratory:  Negative for shortness of breath.    Cardiovascular:  Negative for chest pain.   Gastrointestinal:  Positive for abdominal pain. Negative for anal bleeding.       Objective     Vitals:    03/04/24 1254   BP: 148/80   Pulse: 72   Temp: 97.6 °F (36.4 °C)   SpO2: 97%   Weight: 99.3 kg (219 lb)   Height: 182.9 cm (72\") "     Body mass index is 29.7 kg/m².    Physical Exam  Constitutional:       Appearance: Normal appearance. He is well-developed.   Eyes:      General: No scleral icterus.  Cardiovascular:      Heart sounds: Normal heart sounds. No murmur heard.  Pulmonary:      Effort: Pulmonary effort is normal.   Abdominal:      General: Bowel sounds are normal. There is no distension.      Palpations: Abdomen is soft.      Tenderness: There is no abdominal tenderness. There is no guarding.   Skin:     General: Skin is warm and dry.      Coloration: Skin is not jaundiced.   Neurological:      Mental Status: He is alert.   Psychiatric:         Behavior: Behavior is cooperative.         Imaging Results (Most Recent)       None            Body mass index is 29.7 kg/m².    Assessment & Plan     Diagnoses and all orders for this visit:    1. Left lower quadrant abdominal pain (Primary)  -     Case Request; Standing  -     Implement Anesthesia Orders Day of Procedure; Standing  -     Obtain Informed Consent; Standing  -     Case Request        COLONOSCOPY WITH ANESTHESIA (N/A)    Miralax prep    ? If symptoms related to side effect of Ozempic, he stopped one week ago  Colonoscopy complete 2021 and was unremarkable at that time.  Colonoscopy offered for evaluation of current symptoms  vs waiting, improving bowel movements.   Remain off ozempic, if symptoms persist consider repeat colonoscopy/further imaging.  Discussed possible role of anxiety    Ok to take 1/2 bottle mag citrate - 1 bottle of mag citrate or take 4-5 doses of miralax to initiate bowel movement --if he wishes to work on bowel movement prior to colonoscopy evaluation.      To help improve bowel movements: increase dietary fiber, increase physical activity, use miralax or MOM on reg basis    Sundar Rojas wishes to proceed with colonoscopy.  Sundar Rojas was advised to start taking miralax on daily basis now (adjust dose as needed to facilitate bm).  Proceed with miralax  prep.      Lopez Serna,   03/04/24          Patient Instructions   Fiber Content in Foods  Fiber is a substance that is found in plant foods, such as fruits, vegetables, whole grains, nuts, seeds, and beans. As part of your treatment and recovery plan, your health care provider may recommend that you eat foods that have specific amounts of dietary fiber. Some conditions may require a high-fiber diet while others may require a low-fiber diet.  This sheet gives you information about the dietary fiber content of some common foods. Your health care provider will tell you how much fiber you need in your diet. If you have problems or questions, contact your health care provider or dietitian.  What foods are high in fiber?    Fruits  Blackberries or raspberries (fresh) -- ½ cup (75 g) has 4 g of fiber.  Pear (fresh) -- 1 medium (180 g) has 5.5 g of fiber.  Prunes (dried) -- 6 to 8 pieces (57-76 g) has 5 g of fiber.  Apple with skin -- 1 medium (182 g) has 4.8 g of fiber.  Guava -- 1 cup (128 g) has 8.9 g of fiber.  Vegetables  Peas (frozen) -- ½ cup (80 g) has 4.4 g of fiber.  Potato with skin (baked) -- 1 medium (173 g) has 4.4 g of fiber.  Pumpkin (canned) -- ½ cup (122 g) has 5 g of fiber.  [place] sprouts (cooked) -- ½ cup (78 g) has 4 g of fiber.  Sweet potato -- ½ cup mashed (124 g) has 4 g of fiber.  Winter squash -- 1 cup cooked (205 g) has 5.7 g of fiber.  Grains  Bran cereal -- ½ cup (31 g) has 8.6 g of fiber.  Bulgur (cooked) -- ½ cup (70 g) has 4 g of fiber.  Quinoa (cooked) -- 1 cup (185 g) has 5.2 g of fiber.  Popcorn -- 3 cups (375 g) popped has 5.8 g of fiber.  Spaghetti, whole wheat -- 1 cup (140 g) has 6 g of fiber.  Meats and other proteins  Gallego beans (cooked) -- ½ cup (90 g) has 7.7 g of fiber.  Lentils (cooked) -- ½ cup (90 g) has 7.8 g of fiber.  Kidney beans (canned) -- ½ cup (92.5 g) has 5.7 g of fiber.  Soybeans (canned, frozen, or fresh) -- ½ cup (92.5 g) has 5.2 g of fiber.  Baked  beans, plain or vegetarian (canned) -- ½ cup (130 g) has 5.2 g of fiber.  Garbanzo beans or chickpeas (canned) -- ½ cup (90 g) has 6.6 g of fiber.  Black beans (cooked) -- ½ cup (86 g) has 7.5 g of fiber.  White beans or navy beans (cooked) -- ½ cup (91 g) has 9.3 g of fiber.  The items listed above may not be a complete list of foods with high fiber. Actual amounts of fiber may be different depending on processing. Contact a dietitian for more information.  What foods are moderate in fiber?    Fruits  Banana -- 1 medium (126 g) has 3.2 g of fiber.  Melon -- 1 cup (155 g) has 1.4 g of fiber.  Orange -- 1 small (154 g) has 3.7 g of fiber.  Raisins -- ¼ cup (40 g) has 1.8 g of fiber.  Applesauce, sweetened -- ½ cup (125 g) has 1.5 g of fiber.  Blueberries (fresh) -- ½ cup (75 g) has 1.8 g of fiber.  Strawberries (fresh, sliced) -- 1 cup (150 g) has 3 g of fiber.  Cherries -- 1 cup (140 g) has 2.9 g of fiber.  Vegetables  Broccoli (cooked) -- ½ cup (77.5 g) has 2.1 g of fiber.  Carrots (cooked) -- ½ cup (77.5 g) has 2.2 g of fiber.  Corn (canned or frozen) -- ½ cup (82.5 g) has 2.1 g of fiber.  Potatoes, mashed -- ½ cup (105 g) has 1.6 g of fiber.  Tomato -- 1 medium (62 g) has 1.5 g of fiber.  Green beans (canned) -- ½ cup (83 g) has 2 g of fiber.  Squash, winter -- ½ cup (58 g) has 1 g of fiber.  Sweet potato, baked -- 1 medium (150 g) has 3 g of fiber.  Cauliflower (cooked) -- 1/2 cup (90 g) has 2.3 g of fiber.  Grains  Long-grain brown rice (cooked) -- 1 cup (196 g) has 3.5 g of fiber.  Bagel, plain -- one 4-inch (10 cm) bagel has 2 g of fiber.  Instant oatmeal -- ½ cup (120 g) has about 2 g of fiber.  Macaroni noodles, enriched (cooked) -- 1 cup (140 g) has 2.5 g of fiber.  Multigrain cereal -- ½ cup (15 g) has about 2-4 g of fiber.  Whole-wheat bread -- 1 slice (26 g) has 2 g of fiber.  Whole-wheat spaghetti noodles -- ½ cup (70 g) has 3.2 g of fiber.  Corn tortilla -- one 6-inch (15 cm) tortilla has 1.5 g of  fiber.  Meats and other proteins  Almonds -- ¼ cup or 1 oz (28 g) has 3.5 g of fiber.  Sunflower seeds in shell -- ¼ cup or ½ oz (11.5 g) has 1.1 g of fiber.  Vegetable or soy pushpa -- 1 pushpa (70 g) has 3.4 g of fiber.  Walnuts -- ¼ cup or 1 oz (30 g) has 2 g of fiber.  Flax seed -- 1 Tbsp (7 g) has 2.8 g of fiber.  The items listed above may not be a complete list of foods that have moderate amounts of fiber. Actual amounts of fiber may be different depending on processing. Contact a dietitian for more information.  What foods are low in fiber?    Low-fiber foods contain less than 1 g of fiber per serving. They include:  Fruits  Fruit juice -- ½ cup or 4 fl oz (118 mL) has 0.5 g of fiber.  Vegetables  Lettuce -- 1 cup (35 g) has 0.5 g of fiber.  Cucumber (slices) -- ½ cup (60 g) has 0.3 g of fiber.  Celery -- 1 stalk (40 g) has 0.1 g of fiber.  Grains  Flour tortilla -- one 6-inch (15 cm) tortilla has 0.5 g of fiber.  White rice (cooked) -- ½ cup (81.5 g) has 0.3 g of fiber.  Meats and other proteins  Egg -- 1 large (50 g) has 0 g of fiber.  Meat, poultry, or fish -- 3 oz (85 g) has 0 g of fiber.  Dairy  Milk -- 1 cup or 8 fl oz (237 mL) has 0 g of fiber.  Yogurt -- 1 cup (245 g) has 0 g of fiber.  The items listed above may not be a complete list of foods that are low in fiber. Actual amounts of fiber may be different depending on processing. Contact a dietitian for more information.  Summary  Fiber is a substance that is found in plant foods, such as fruits, vegetables, whole grains, nuts, seeds, and beans.  As part of your treatment and recovery plan, your health care provider may recommend that you eat foods that have specific amounts of dietary fiber.  This information is not intended to replace advice given to you by your health care provider. Make sure you discuss any questions you have with you

## 2024-03-04 NOTE — PATIENT INSTRUCTIONS
Fiber Content in Foods  Fiber is a substance that is found in plant foods, such as fruits, vegetables, whole grains, nuts, seeds, and beans. As part of your treatment and recovery plan, your health care provider may recommend that you eat foods that have specific amounts of dietary fiber. Some conditions may require a high-fiber diet while others may require a low-fiber diet.  This sheet gives you information about the dietary fiber content of some common foods. Your health care provider will tell you how much fiber you need in your diet. If you have problems or questions, contact your health care provider or dietitian.  What foods are high in fiber?    Fruits  Blackberries or raspberries (fresh) -- ½ cup (75 g) has 4 g of fiber.  Pear (fresh) -- 1 medium (180 g) has 5.5 g of fiber.  Prunes (dried) -- 6 to 8 pieces (57-76 g) has 5 g of fiber.  Apple with skin -- 1 medium (182 g) has 4.8 g of fiber.  Guava -- 1 cup (128 g) has 8.9 g of fiber.  Vegetables  Peas (frozen) -- ½ cup (80 g) has 4.4 g of fiber.  Potato with skin (baked) -- 1 medium (173 g) has 4.4 g of fiber.  Pumpkin (canned) -- ½ cup (122 g) has 5 g of fiber.  [place] sprouts (cooked) -- ½ cup (78 g) has 4 g of fiber.  Sweet potato -- ½ cup mashed (124 g) has 4 g of fiber.  Winter squash -- 1 cup cooked (205 g) has 5.7 g of fiber.  Grains  Bran cereal -- ½ cup (31 g) has 8.6 g of fiber.  Bulgur (cooked) -- ½ cup (70 g) has 4 g of fiber.  Quinoa (cooked) -- 1 cup (185 g) has 5.2 g of fiber.  Popcorn -- 3 cups (375 g) popped has 5.8 g of fiber.  Spaghetti, whole wheat -- 1 cup (140 g) has 6 g of fiber.  Meats and other proteins  Gallego beans (cooked) -- ½ cup (90 g) has 7.7 g of fiber.  Lentils (cooked) -- ½ cup (90 g) has 7.8 g of fiber.  Kidney beans (canned) -- ½ cup (92.5 g) has 5.7 g of fiber.  Soybeans (canned, frozen, or fresh) -- ½ cup (92.5 g) has 5.2 g of fiber.  Baked beans, plain or vegetarian (canned) -- ½ cup (130 g) has 5.2 g of  fiber.  Garbanzo beans or chickpeas (canned) -- ½ cup (90 g) has 6.6 g of fiber.  Black beans (cooked) -- ½ cup (86 g) has 7.5 g of fiber.  White beans or navy beans (cooked) -- ½ cup (91 g) has 9.3 g of fiber.  The items listed above may not be a complete list of foods with high fiber. Actual amounts of fiber may be different depending on processing. Contact a dietitian for more information.  What foods are moderate in fiber?    Fruits  Banana -- 1 medium (126 g) has 3.2 g of fiber.  Melon -- 1 cup (155 g) has 1.4 g of fiber.  Orange -- 1 small (154 g) has 3.7 g of fiber.  Raisins -- ¼ cup (40 g) has 1.8 g of fiber.  Applesauce, sweetened -- ½ cup (125 g) has 1.5 g of fiber.  Blueberries (fresh) -- ½ cup (75 g) has 1.8 g of fiber.  Strawberries (fresh, sliced) -- 1 cup (150 g) has 3 g of fiber.  Cherries -- 1 cup (140 g) has 2.9 g of fiber.  Vegetables  Broccoli (cooked) -- ½ cup (77.5 g) has 2.1 g of fiber.  Carrots (cooked) -- ½ cup (77.5 g) has 2.2 g of fiber.  Corn (canned or frozen) -- ½ cup (82.5 g) has 2.1 g of fiber.  Potatoes, mashed -- ½ cup (105 g) has 1.6 g of fiber.  Tomato -- 1 medium (62 g) has 1.5 g of fiber.  Green beans (canned) -- ½ cup (83 g) has 2 g of fiber.  Squash, winter -- ½ cup (58 g) has 1 g of fiber.  Sweet potato, baked -- 1 medium (150 g) has 3 g of fiber.  Cauliflower (cooked) -- 1/2 cup (90 g) has 2.3 g of fiber.  Grains  Long-grain brown rice (cooked) -- 1 cup (196 g) has 3.5 g of fiber.  Bagel, plain -- one 4-inch (10 cm) bagel has 2 g of fiber.  Instant oatmeal -- ½ cup (120 g) has about 2 g of fiber.  Macaroni noodles, enriched (cooked) -- 1 cup (140 g) has 2.5 g of fiber.  Multigrain cereal -- ½ cup (15 g) has about 2-4 g of fiber.  Whole-wheat bread -- 1 slice (26 g) has 2 g of fiber.  Whole-wheat spaghetti noodles -- ½ cup (70 g) has 3.2 g of fiber.  Corn tortilla -- one 6-inch (15 cm) tortilla has 1.5 g of fiber.  Meats and other proteins  Almonds -- ¼ cup or 1 oz (28 g) has  3.5 g of fiber.  Sunflower seeds in shell -- ¼ cup or ½ oz (11.5 g) has 1.1 g of fiber.  Vegetable or soy pushpa -- 1 pushpa (70 g) has 3.4 g of fiber.  Walnuts -- ¼ cup or 1 oz (30 g) has 2 g of fiber.  Flax seed -- 1 Tbsp (7 g) has 2.8 g of fiber.  The items listed above may not be a complete list of foods that have moderate amounts of fiber. Actual amounts of fiber may be different depending on processing. Contact a dietitian for more information.  What foods are low in fiber?    Low-fiber foods contain less than 1 g of fiber per serving. They include:  Fruits  Fruit juice -- ½ cup or 4 fl oz (118 mL) has 0.5 g of fiber.  Vegetables  Lettuce -- 1 cup (35 g) has 0.5 g of fiber.  Cucumber (slices) -- ½ cup (60 g) has 0.3 g of fiber.  Celery -- 1 stalk (40 g) has 0.1 g of fiber.  Grains  Flour tortilla -- one 6-inch (15 cm) tortilla has 0.5 g of fiber.  White rice (cooked) -- ½ cup (81.5 g) has 0.3 g of fiber.  Meats and other proteins  Egg -- 1 large (50 g) has 0 g of fiber.  Meat, poultry, or fish -- 3 oz (85 g) has 0 g of fiber.  Dairy  Milk -- 1 cup or 8 fl oz (237 mL) has 0 g of fiber.  Yogurt -- 1 cup (245 g) has 0 g of fiber.  The items listed above may not be a complete list of foods that are low in fiber. Actual amounts of fiber may be different depending on processing. Contact a dietitian for more information.  Summary  Fiber is a substance that is found in plant foods, such as fruits, vegetables, whole grains, nuts, seeds, and beans.  As part of your treatment and recovery plan, your health care provider may recommend that you eat foods that have specific amounts of dietary fiber.  This information is not intended to replace advice given to you by your health care provider. Make sure you discuss any questions you have with you

## 2024-03-27 ENCOUNTER — HOSPITAL ENCOUNTER (OUTPATIENT)
Facility: HOSPITAL | Age: 74
Setting detail: HOSPITAL OUTPATIENT SURGERY
Discharge: HOME | End: 2024-03-27
Attending: INTERNAL MEDICINE | Admitting: INTERNAL MEDICINE
Payer: MEDICARE

## 2024-03-27 ENCOUNTER — ANESTHESIA (OUTPATIENT)
Dept: GASTROENTEROLOGY | Facility: HOSPITAL | Age: 74
End: 2024-03-27
Payer: MEDICARE

## 2024-03-27 ENCOUNTER — TELEPHONE (OUTPATIENT)
Dept: GASTROENTEROLOGY | Facility: CLINIC | Age: 74
End: 2024-03-27
Payer: MEDICARE

## 2024-03-27 ENCOUNTER — ANESTHESIA EVENT (OUTPATIENT)
Dept: GASTROENTEROLOGY | Facility: HOSPITAL | Age: 74
End: 2024-03-27
Payer: MEDICARE

## 2024-03-27 VITALS
OXYGEN SATURATION: 97 % | DIASTOLIC BLOOD PRESSURE: 63 MMHG | SYSTOLIC BLOOD PRESSURE: 153 MMHG | BODY MASS INDEX: 30.48 KG/M2 | RESPIRATION RATE: 22 BRPM | HEIGHT: 72 IN | HEART RATE: 69 BPM | TEMPERATURE: 97.1 F | WEIGHT: 225 LBS

## 2024-03-27 DIAGNOSIS — R10.32 LEFT LOWER QUADRANT ABDOMINAL PAIN: ICD-10-CM

## 2024-03-27 LAB — GLUCOSE BLDC GLUCOMTR-MCNC: 120 MG/DL (ref 70–130)

## 2024-03-27 PROCEDURE — 25010000002 PROPOFOL 10 MG/ML EMULSION: Performed by: NURSE ANESTHETIST, CERTIFIED REGISTERED

## 2024-03-27 PROCEDURE — 45385 COLONOSCOPY W/LESION REMOVAL: CPT | Performed by: INTERNAL MEDICINE

## 2024-03-27 PROCEDURE — 88305 TISSUE EXAM BY PATHOLOGIST: CPT | Performed by: INTERNAL MEDICINE

## 2024-03-27 PROCEDURE — 25810000003 SODIUM CHLORIDE 0.9 % SOLUTION: Performed by: NURSE ANESTHETIST, CERTIFIED REGISTERED

## 2024-03-27 PROCEDURE — 82948 REAGENT STRIP/BLOOD GLUCOSE: CPT

## 2024-03-27 RX ORDER — LIDOCAINE HYDROCHLORIDE 10 MG/ML
0.5 INJECTION, SOLUTION EPIDURAL; INFILTRATION; INTRACAUDAL; PERINEURAL ONCE AS NEEDED
Status: DISCONTINUED | OUTPATIENT
Start: 2024-03-27 | End: 2024-03-27 | Stop reason: HOSPADM

## 2024-03-27 RX ORDER — PROPOFOL 10 MG/ML
VIAL (ML) INTRAVENOUS AS NEEDED
Status: DISCONTINUED | OUTPATIENT
Start: 2024-03-27 | End: 2024-03-27 | Stop reason: SURG

## 2024-03-27 RX ORDER — SODIUM CHLORIDE 9 MG/ML
500 INJECTION, SOLUTION INTRAVENOUS CONTINUOUS PRN
Status: DISCONTINUED | OUTPATIENT
Start: 2024-03-27 | End: 2024-03-27 | Stop reason: HOSPADM

## 2024-03-27 RX ORDER — LIDOCAINE HYDROCHLORIDE 20 MG/ML
INJECTION, SOLUTION EPIDURAL; INFILTRATION; INTRACAUDAL; PERINEURAL AS NEEDED
Status: DISCONTINUED | OUTPATIENT
Start: 2024-03-27 | End: 2024-03-27 | Stop reason: SURG

## 2024-03-27 RX ORDER — SODIUM CHLORIDE 0.9 % (FLUSH) 0.9 %
10 SYRINGE (ML) INJECTION AS NEEDED
Status: DISCONTINUED | OUTPATIENT
Start: 2024-03-27 | End: 2024-03-27 | Stop reason: HOSPADM

## 2024-03-27 RX ADMIN — SODIUM CHLORIDE 500 ML: 9 INJECTION, SOLUTION INTRAVENOUS at 07:30

## 2024-03-27 RX ADMIN — PROPOFOL INJECTABLE EMULSION 100 MG: 10 INJECTION, EMULSION INTRAVENOUS at 08:13

## 2024-03-27 RX ADMIN — PROPOFOL INJECTABLE EMULSION 100 MG: 10 INJECTION, EMULSION INTRAVENOUS at 08:20

## 2024-03-27 RX ADMIN — LIDOCAINE HYDROCHLORIDE 100 MG: 20 INJECTION, SOLUTION EPIDURAL; INFILTRATION; INTRACAUDAL; PERINEURAL at 08:11

## 2024-03-27 RX ADMIN — PROPOFOL INJECTABLE EMULSION 100 MG: 10 INJECTION, EMULSION INTRAVENOUS at 08:11

## 2024-03-27 NOTE — ANESTHESIA POSTPROCEDURE EVALUATION
Patient: Sundar Rojas    Procedure Summary       Date: 03/27/24 Room / Location: [facility] ENDOSCOPY 4 / [facility] ENDOSCOPY    Anesthesia Start: 0810 Anesthesia Stop: 0829    Procedure: COLONOSCOPY WITH ANESTHESIA Diagnosis:       Left lower quadrant abdominal pain      (Left lower quadrant abdominal pain [R10.32])    Surgeons: Lopez Serna DO Provider: Eduar Wilcox CRNA    Anesthesia Type: MAC ASA Status: 3            Anesthesia Type: MAC    Vitals  Vitals Value Taken Time   /63 03/27/24 0845   Temp     Pulse 69 03/27/24 0845   Resp 22 03/27/24 0845   SpO2 97 % 03/27/24 0845           Post Anesthesia Care and Evaluation    Patient location during evaluation: PHASE II  Patient participation: complete - patient participated  Level of consciousness: awake and sleepy but conscious  Pain score: 0  Pain management: adequate    Airway patency: patent  Anesthetic complications: No anesthetic complications    Cardiovascular status: acceptable  Respiratory status: acceptable  Hydration status: acceptable

## 2024-03-27 NOTE — ANESTHESIA PREPROCEDURE EVALUATION
Anesthesia Evaluation     Patient summary reviewed   no history of anesthetic complications:   NPO Solid Status: > 8 hours             Airway   Mallampati: II  TM distance: >3 FB  No difficulty expected  Dental      Pulmonary - negative pulmonary ROS   (-) asthma, sleep apnea, not a smoker  Cardiovascular   Exercise tolerance: good (4-7 METS)    (+) hypertension 2 medications or greater, hyperlipidemia      Neuro/Psych  (-) seizures, TIA, CVA  GI/Hepatic/Renal/Endo    (+) obesity, GERD, diabetes mellitus  (-) liver disease, no renal disease    Musculoskeletal     Abdominal    Substance History      OB/GYN          Other                    Anesthesia Plan    ASA 3     MAC       Anesthetic plan, risks, benefits, and alternatives have been provided, discussed and informed consent has been obtained with: patient and spouse/significant other.    CODE STATUS:

## 2024-03-28 LAB
CYTO UR: NORMAL
LAB AP CASE REPORT: NORMAL
Lab: NORMAL
PATH REPORT.FINAL DX SPEC: NORMAL
PATH REPORT.GROSS SPEC: NORMAL

## 2024-06-25 ENCOUNTER — HOSPITAL ENCOUNTER (EMERGENCY)
Facility: HOSPITAL | Age: 74
Discharge: HOME | End: 2024-06-26
Attending: EMERGENCY MEDICINE
Payer: MEDICARE

## 2024-06-25 ENCOUNTER — APPOINTMENT (OUTPATIENT)
Dept: GENERAL RADIOLOGY | Facility: HOSPITAL | Age: 74
End: 2024-06-25
Payer: MEDICARE

## 2024-06-25 ENCOUNTER — APPOINTMENT (OUTPATIENT)
Dept: CT IMAGING | Facility: HOSPITAL | Age: 74
End: 2024-06-25
Payer: MEDICARE

## 2024-06-25 DIAGNOSIS — R07.9 CHEST PAIN, UNSPECIFIED TYPE: Primary | ICD-10-CM

## 2024-06-25 DIAGNOSIS — I10 HYPERTENSION, UNSPECIFIED TYPE: ICD-10-CM

## 2024-06-25 DIAGNOSIS — R42 DIZZINESS: ICD-10-CM

## 2024-06-25 LAB
ALBUMIN SERPL-MCNC: 4.7 G/DL (ref 3.5–5.2)
ALBUMIN/GLOB SERPL: 1.7 G/DL
ALP SERPL-CCNC: 57 U/L (ref 39–117)
ALT SERPL W P-5'-P-CCNC: 41 U/L (ref 1–41)
ANION GAP SERPL CALCULATED.3IONS-SCNC: 14 MMOL/L (ref 5–15)
AST SERPL-CCNC: 33 U/L (ref 1–40)
BASOPHILS # BLD AUTO: 0.03 10*3/MM3 (ref 0–0.2)
BASOPHILS NFR BLD AUTO: 0.4 % (ref 0–1.5)
BILIRUB SERPL-MCNC: 0.5 MG/DL (ref 0–1.2)
BUN SERPL-MCNC: 13 MG/DL (ref 8–23)
BUN/CREAT SERPL: 15.5 (ref 7–25)
CALCIUM SPEC-SCNC: 9.4 MG/DL (ref 8.6–10.5)
CHLORIDE SERPL-SCNC: 102 MMOL/L (ref 98–107)
CO2 SERPL-SCNC: 25 MMOL/L (ref 22–29)
CREAT SERPL-MCNC: 0.84 MG/DL (ref 0.76–1.27)
DEPRECATED RDW RBC AUTO: 42.9 FL (ref 37–54)
EGFRCR SERPLBLD CKD-EPI 2021: 92.1 ML/MIN/1.73
EOSINOPHIL # BLD AUTO: 0 10*3/MM3 (ref 0–0.4)
EOSINOPHIL NFR BLD AUTO: 0 % (ref 0.3–6.2)
ERYTHROCYTE [DISTWIDTH] IN BLOOD BY AUTOMATED COUNT: 12.6 % (ref 12.3–15.4)
GLOBULIN UR ELPH-MCNC: 2.7 GM/DL
GLUCOSE SERPL-MCNC: 169 MG/DL (ref 65–99)
HCT VFR BLD AUTO: 42 % (ref 37.5–51)
HGB BLD-MCNC: 14.1 G/DL (ref 13–17.7)
HOLD SPECIMEN: NORMAL
IMM GRANULOCYTES # BLD AUTO: 0.02 10*3/MM3 (ref 0–0.05)
IMM GRANULOCYTES NFR BLD AUTO: 0.3 % (ref 0–0.5)
LYMPHOCYTES # BLD AUTO: 1.64 10*3/MM3 (ref 0.7–3.1)
LYMPHOCYTES NFR BLD AUTO: 22.3 % (ref 19.6–45.3)
MCH RBC QN AUTO: 31 PG (ref 26.6–33)
MCHC RBC AUTO-ENTMCNC: 33.6 G/DL (ref 31.5–35.7)
MCV RBC AUTO: 92.3 FL (ref 79–97)
MONOCYTES # BLD AUTO: 0.65 10*3/MM3 (ref 0.1–0.9)
MONOCYTES NFR BLD AUTO: 8.9 % (ref 5–12)
NEUTROPHILS NFR BLD AUTO: 5 10*3/MM3 (ref 1.7–7)
NEUTROPHILS NFR BLD AUTO: 68.1 % (ref 42.7–76)
NRBC BLD AUTO-RTO: 0 /100 WBC (ref 0–0.2)
PLATELET # BLD AUTO: 171 10*3/MM3 (ref 140–450)
PMV BLD AUTO: 10.5 FL (ref 6–12)
POTASSIUM SERPL-SCNC: 3.4 MMOL/L (ref 3.5–5.2)
PROT SERPL-MCNC: 7.4 G/DL (ref 6–8.5)
RBC # BLD AUTO: 4.55 10*6/MM3 (ref 4.14–5.8)
SODIUM SERPL-SCNC: 141 MMOL/L (ref 136–145)
TROPONIN T SERPL HS-MCNC: 14 NG/L
WBC NRBC COR # BLD AUTO: 7.34 10*3/MM3 (ref 3.4–10.8)
WHOLE BLOOD HOLD COAG: NORMAL
WHOLE BLOOD HOLD SPECIMEN: NORMAL

## 2024-06-25 PROCEDURE — 93005 ELECTROCARDIOGRAM TRACING: CPT

## 2024-06-25 PROCEDURE — 93010 ELECTROCARDIOGRAM REPORT: CPT | Performed by: INTERNAL MEDICINE

## 2024-06-25 PROCEDURE — 85025 COMPLETE CBC W/AUTO DIFF WBC: CPT | Performed by: EMERGENCY MEDICINE

## 2024-06-25 PROCEDURE — 80053 COMPREHEN METABOLIC PANEL: CPT | Performed by: EMERGENCY MEDICINE

## 2024-06-25 PROCEDURE — 70450 CT HEAD/BRAIN W/O DYE: CPT

## 2024-06-25 PROCEDURE — 71045 X-RAY EXAM CHEST 1 VIEW: CPT

## 2024-06-25 PROCEDURE — 84484 ASSAY OF TROPONIN QUANT: CPT | Performed by: EMERGENCY MEDICINE

## 2024-06-25 PROCEDURE — 99284 EMERGENCY DEPT VISIT MOD MDM: CPT

## 2024-06-25 PROCEDURE — 93005 ELECTROCARDIOGRAM TRACING: CPT | Performed by: EMERGENCY MEDICINE

## 2024-06-25 RX ORDER — ASPIRIN 81 MG/1
324 TABLET, CHEWABLE ORAL ONCE
Status: COMPLETED | OUTPATIENT
Start: 2024-06-25 | End: 2024-06-25

## 2024-06-25 RX ORDER — SODIUM CHLORIDE 0.9 % (FLUSH) 0.9 %
10 SYRINGE (ML) INJECTION AS NEEDED
Status: DISCONTINUED | OUTPATIENT
Start: 2024-06-25 | End: 2024-06-26 | Stop reason: HOSPADM

## 2024-06-25 RX ADMIN — ASPIRIN 243 MG: 81 TABLET, CHEWABLE ORAL at 22:21

## 2024-06-26 VITALS
SYSTOLIC BLOOD PRESSURE: 169 MMHG | TEMPERATURE: 98.2 F | OXYGEN SATURATION: 98 % | RESPIRATION RATE: 20 BRPM | HEIGHT: 72 IN | DIASTOLIC BLOOD PRESSURE: 66 MMHG | HEART RATE: 55 BPM | WEIGHT: 220 LBS | BODY MASS INDEX: 29.8 KG/M2

## 2024-06-26 LAB
GEN 5 2HR TROPONIN T REFLEX: 14 NG/L
QT INTERVAL: 424 MS
QTC INTERVAL: 426 MS
TROPONIN T DELTA: 0 NG/L

## 2024-06-26 PROCEDURE — 84484 ASSAY OF TROPONIN QUANT: CPT | Performed by: EMERGENCY MEDICINE

## 2024-06-26 PROCEDURE — 36415 COLL VENOUS BLD VENIPUNCTURE: CPT

## 2024-06-26 NOTE — DISCHARGE INSTRUCTIONS
It was very nice to meet you,Sundar . Thank you for allowing us to take care of you today at [facility].     Today you were seen in the emergency department for your symptoms. Please understand that an ER evaluation is just the start of your evaluation. We do the best we can, but we are often unable to fully find what is causing your symptoms from one evaluation.  Because of this, the goal is to determine whether you need to be evaluated in the hospital or if it is safe for you to go home and see other doctors provided such as primary care physicians or specialist on an outpatient basis.      Like we discussed, I strongly urge that you follow up with your primary care doctor. Please call their office to set up an appointment as soon as possible so that you can be re-evaluated for improvement in your symptoms or for any other questions.  I have provided the information needed, including phone number, to call to set up an appointment below in these discharge papers.      Educational material has also been provided in the following pages regarding what we have discussed today.  I have ordered outpatient stress test for you.  Please return the ER for any worsening symptoms otherwise follow-up with the primary MD and keep a blood pressure diary     Please return to the emergency room within 12-48 hours if you experience symptoms such as the following:   Fever, chills, chest pain or shortness of breath, pain with inspiration/expiration, pain that travels to your arms, neck or back, nausea, vomiting, severe headache, tearing pain in your chest, dizziness, feel as though you are about to pass out, OR if you have any worsening symptoms, or any other concerns.

## 2024-06-26 NOTE — ED PROVIDER NOTES
Subjective   History of Present Illness  Patient is a 72-year-old who came the ER complaining of chest pain he described the chest pain as tightness in the chest and elevated blood pressure along with dizziness.  No focal neurological deficit associate with this.   Does not any history of cardiac disease in the past.  Just does not feel well    Chest Pain  Pain location:  R lateral chest and L lateral chest  Pain quality: tightness    Pain radiates to:  Does not radiate  Pain severity:  Mild  Onset quality:  Sudden  Timing:  Intermittent  Chronicity:  New  Context: not breathing, not drug use, not eating, not lifting, not movement, not raising an arm, not at rest and not stress    Relieved by:  Nothing  Worsened by:  Nothing  Ineffective treatments:  None tried  Associated symptoms: dizziness    Associated symptoms: no abdominal pain, no AICD problem, no altered mental status, no anorexia, no back pain, no claudication, no cough, no dysphagia, no fatigue, no fever, no headache, no lower extremity edema, no nausea, no numbness, no orthopnea, no palpitations, no PND, no syncope, no vomiting and no weakness    Risk factors: hypertension and male sex    Risk factors: no aortic disease, no diabetes mellitus, no immobilization, no Marfan's syndrome, no prior DVT/PE and no smoking        Review of Systems   Constitutional: Negative.  Negative for fatigue and fever.   HENT: Negative.  Negative for trouble swallowing.    Respiratory:  Negative for cough.    Cardiovascular:  Positive for chest pain. Negative for palpitations, orthopnea, claudication, syncope and PND.   Gastrointestinal: Negative.  Negative for abdominal distention, abdominal pain, anorexia, nausea and vomiting.   Endocrine: Negative.    Genitourinary: Negative.    Musculoskeletal: Negative.  Negative for back pain and neck pain.   Skin:  Negative for color change and pallor.   Neurological:  Positive for dizziness. Negative for syncope, weakness,  light-headedness, numbness and headaches.   Hematological: Negative.  Does not bruise/bleed easily.   All other systems reviewed and are negative.      Past Medical History:   Diagnosis Date    Abdominal pain     Abnormal liver enzymes     Colon polyps     Constipation     Diabetes mellitus     Diarrhea     GERD (gastroesophageal reflux disease)     History of rectal bleeding     Hyperlipidemia     Hypertension     Insomnia        Allergies   Allergen Reactions    Demerol [Meperidine] Confusion    Canagliflozin Rash    Dulaglutide Rash       Past Surgical History:   Procedure Laterality Date    CHOLECYSTECTOMY      COLONOSCOPY  06/04/2012    tubulobillous adenoma 3 year    COLONOSCOPY  06/07/2010    multiple polypecytomies - see path -- 2 yr    COLONOSCOPY  04/06/2004    COLONOSCOPY N/A 4/6/2021    Procedure: COLONOSCOPY WITH ANESTHESIA;  Surgeon: Lopez Serna DO;  Location: [facility] ENDOSCOPY;  Service: Gastroenterology;  Laterality: N/A;  pre hx adenomatous colon polyp  post diverticulosis; polyps   Oern Rivera MD    COLONOSCOPY N/A 3/27/2024    Procedure: COLONOSCOPY WITH ANESTHESIA;  Surgeon: Lopez Serna DO;  Location: [facility] ENDOSCOPY;  Service: Gastroenterology;  Laterality: N/A;  pre op LLQ abdomianal pain  post polyp  pcp Oren Rivera    ENDOSCOPY  02/16/2007    ENDOSCOPY N/A 4/6/2021    Procedure: ESOPHAGOGASTRODUODENOSCOPY WITH ANESTHESIA;  Surgeon: Lopez Serna DO;  Location: [facility] ENDOSCOPY;  Service: Gastroenterology;  Laterality: N/A;  pre heartburn  post normal  Oren Rivera MD    EYE SURGERY Bilateral     cataract    HEMORRHOIDECTOMY      KNEE ARTHROSCOPY         Family History   Problem Relation Age of Onset    Anal fissures Father     Colon cancer Neg Hx     Colon polyps Neg Hx        Social History     Socioeconomic History    Marital status:    Tobacco Use    Smoking status: Never    Smokeless tobacco: Never   Vaping Use    Vaping status: Never Used    Substance and Sexual Activity    Alcohol use: Yes     Comment: not very often    Drug use: No    Sexual activity: Defer           Objective   Physical Exam  Vitals and nursing note reviewed. Exam conducted with a chaperone present.   Constitutional:       General: He is not in acute distress.     Appearance: Normal appearance. He is well-developed. He is not toxic-appearing.   HENT:      Head: Normocephalic and atraumatic.      Nose: Nose normal.      Mouth/Throat:      Mouth: Mucous membranes are moist.      Pharynx: Uvula midline.   Eyes:      General: Lids are normal. Lids are everted, no foreign bodies appreciated.      Conjunctiva/sclera: Conjunctivae normal.      Pupils: Pupils are equal, round, and reactive to light.      Comments: No nystagmus   Neck:      Vascular: Normal carotid pulses. No carotid bruit or JVD.      Trachea: Trachea and phonation normal. No tracheal deviation.   Cardiovascular:      Rate and Rhythm: Normal rate and regular rhythm.      Chest Wall: PMI is not displaced.      Pulses: Normal pulses.      Heart sounds: Normal heart sounds.      No gallop.   Pulmonary:      Effort: Pulmonary effort is normal. No tachypnea, accessory muscle usage or respiratory distress.      Breath sounds: Normal breath sounds. No stridor. No decreased breath sounds, wheezing, rhonchi or rales.   Abdominal:      General: Bowel sounds are normal. There is no distension.      Palpations: Abdomen is soft.      Tenderness: There is no abdominal tenderness.   Musculoskeletal:         General: No swelling. Normal range of motion.      Cervical back: Full passive range of motion without pain, normal range of motion and neck supple. No rigidity.      Comments: Lower extremity exam bilaterally is unremarkable.  There is no right or left calf tenderness .  There is no palpable venous cord.  No obvious difference in the size of the legs.  No pitting edema.  The dorsalis pedis and posterior tibial femoral and popliteal  pulses are palpable and +2 bilaterally.  Homans sign is negative   Skin:     General: Skin is warm and dry.      Capillary Refill: Capillary refill takes less than 2 seconds.      Coloration: Skin is not jaundiced or pale.      Nails: There is no clubbing.   Neurological:      General: No focal deficit present.      Mental Status: He is alert and oriented to person, place, and time.      GCS: GCS eye subscore is 4. GCS verbal subscore is 5. GCS motor subscore is 6.      Cranial Nerves: Cranial nerves 2-12 are intact. No cranial nerve deficit, dysarthria or facial asymmetry.      Sensory: Sensation is intact.      Motor: Motor function is intact. No weakness, tremor, atrophy or abnormal muscle tone.      Coordination: Coordination is intact. Coordination normal.      Gait: Gait normal.      Deep Tendon Reflexes: Reflexes are normal and symmetric. Reflexes normal.   Psychiatric:         Speech: Speech normal.         Behavior: Behavior normal.         Procedures           ED Course  ED Course as of 06/26/24 0055 Wed Jun 26, 2024   0050 Case were discussed the patient and I have offered him to admit to the hospitalist for stress test I will keep him in the ER and get a stress test the morning the patient is pain-free and does not want to do either of those wants to go home he is agreeable to getting an outpatient stress test. [TS]      ED Course User Index  [TS] Marquise Hutchison MD                HEART Score: 3                              Medical Decision Making  Differential Diagnosis:  I considered chest wall pain, muscle strain, costochondritis, pleurisy, rib fracture, herpes zoster, cardiovascular etiology, myocardial infarction, intermediate coronary syndrome, unstable angina, angina, aortic dissection, pericarditis, pulmonary etiology, pulmonary embolism, pneumonia, pneumothorax, lung cancer, gastroesophageal reflux disease, esophagitis, esophageal spasm and gastrointestinal etiology as a possible cause of chest  pain in this patient. This is a partial list of diagnoses considered.        Problems Addressed:  Chest pain, unspecified type: complicated acute illness or injury  Dizziness: complicated acute illness or injury  Hypertension, unspecified type: complicated acute illness or injury    Amount and/or Complexity of Data Reviewed  Labs: ordered.     Details: Labs reviewed  Radiology: ordered.     Details: CAT scan is negative  ECG/medicine tests: ordered.     Details: Ischemic    Risk  OTC drugs.  Prescription drug management.  Risk Details: Heart score 3  Wells score 0  This patient presents with chest pain , patient arrived hemodynamically stable was placed on the monitor and IV access obtained EKG was obtained and did not reveal any malignant/unstable dysrhythmias any acute ST elevation, no evidence of Brugada, or significantly prolonged QT .  Presentation not consistent with other acute, emergent cause of chest pain at this time.  Low suspicion for acute PE is low risk per Wells and Years criteria and no evidence of DVT such as calf swelling, tenderness, palpable tortuous lower extremity vein, or Homans' sign.  Low suspicion for pneumothorax as the patient is saturating well and has no radiographic evidence of a pneumothorax.  Low suspicion for dissection there is no widened mediastinum, hypotension, pulses deficit, and no tearing back/abdominal pain.  Low suspicion for tamponade as there is no JVD, muffled heart sounds, electrical alternans on EKG, and no hypotension.  Low suspicion for pericarditis as there is no diffuse ST elevation or IA depression and the patient is afebrile.  No evidence of GI bleed per patient's history.  Low suspicion for CHF exacerbation as there is no evidence of volume overload and no evidence of severely elevated BNP.  Low suspicion for pneumonia since the patient has no fever no productive cough no chest x-ray findings of pneumonia and no leukocytosis.       This patient presented to the  ED with dizziness/vertigo hemodynamically stable .EKG shows no acute ischemia or malignant arrhythmias .patient denies new weakness on one side of the body, diplopia, vertigo, slurred speech, headache, or difficulty walking.  Low suspicion for cerebellar hemorrhage or vertebral artery dissection since the patient does not have any headache or neck pain.  Low suspicion for stroke since the patient has no facial/limb weakness or numbness.  There is low suspicion for posterior circulation stroke since the patient has no dysarthria no diplopia no dysmetria no dysphonia and no dysphagia. there is absence of spontaneous vertical nystagmus and the patient is able to walk unaided      There is low clinical suspicion for cardiac dysrhythmia with a normal EKG normal hemodynamics and normal vitals.  There is low risk for cardiac ischemia with a negative EKG and negative cardiac markers. There is low risk for pulmonary etiology with normal chest x-ray no hypoxia no tachypnea and normal D-dimer level along with a negative Years algorithm and  low risk Wells.  There is low risk for GI bleeding with the patient at baseline hemoglobin no evidence of hematemesis or melena or history of any blood loss.  Low clinical suspicion of PTX with bilateral equal breath sounds and a negative chest x-ray.  Low risk for aortic dissection with no mediastinal widening no hypo or hypertension and no cardiac murmurs or pulse deficits.  Also low risk for tamponade with no pulses paradoxus no pulses alternans on the EKG no distant heart sounds and no history of malignancy.                Final diagnoses:   Chest pain, unspecified type   Hypertension, unspecified type   Dizziness       ED Disposition  ED Disposition       ED Disposition   Discharge    Condition   Stable    Comment   --               Oren Rivera MD  [address] [address]  [phone]    Schedule an appointment as soon as possible for a visit             Medication List      No changes were made to your prescriptions during this visit.            Marquise Hutchison MD  06/25/24 2222       Marquise Hutchison MD  06/26/24 0053       Marquise Hutchison MD  06/26/24 0050

## 2024-07-02 ENCOUNTER — HOSPITAL ENCOUNTER (OUTPATIENT)
Dept: CARDIOLOGY | Facility: HOSPITAL | Age: 74
Discharge: HOME | End: 2024-07-02
Admitting: EMERGENCY MEDICINE
Payer: MEDICARE

## 2024-07-02 VITALS — HEIGHT: 72 IN | WEIGHT: 220 LBS | BODY MASS INDEX: 29.8 KG/M2

## 2024-07-02 DIAGNOSIS — R07.9 CHEST PAIN, UNSPECIFIED TYPE: ICD-10-CM

## 2024-07-02 PROCEDURE — 93017 CV STRESS TEST TRACING ONLY: CPT

## 2024-07-02 PROCEDURE — 25510000001 PERFLUTREN 6.52 MG/ML SUSPENSION: Performed by: HOSPITALIST

## 2024-07-02 PROCEDURE — 93350 STRESS TTE ONLY: CPT

## 2024-07-02 RX ADMIN — PERFLUTREN 8.48 MG: 6.52 INJECTION, SUSPENSION INTRAVENOUS at 15:52

## 2024-07-03 LAB
BH CV STRESS BP STAGE 1: NORMAL
BH CV STRESS DURATION MIN STAGE 1: 3
BH CV STRESS DURATION SEC STAGE 1: 34
BH CV STRESS GRADE STAGE 1: 10
BH CV STRESS HR STAGE 1: 125
BH CV STRESS METS STAGE 1: 5
BH CV STRESS PROTOCOL 1: NORMAL
BH CV STRESS RECOVERY BP: NORMAL MMHG
BH CV STRESS RECOVERY HR: 78 BPM
BH CV STRESS SPEED STAGE 1: 1.7
BH CV STRESS STAGE 1: 1
MAXIMAL PREDICTED HEART RATE: 147 BPM
PERCENT MAX PREDICTED HR: 85.03 %
STRESS BASELINE BP: NORMAL MMHG
STRESS BASELINE HR: 65 BPM
STRESS PERCENT HR: 100 %
STRESS POST ESTIMATED WORKLOAD: 5 METS
STRESS POST EXERCISE DUR MIN: 3 MIN
STRESS POST EXERCISE DUR SEC: 34 SEC
STRESS POST PEAK BP: NORMAL MMHG
STRESS POST PEAK HR: 125 BPM
STRESS TARGET HR: 125 BPM

## 2025-07-31 ENCOUNTER — TELEPHONE (OUTPATIENT)
Age: 75
End: 2025-07-31

## 2025-08-25 ENCOUNTER — OFFICE VISIT (OUTPATIENT)
Age: 75
End: 2025-08-25

## 2025-08-25 VITALS — HEIGHT: 72 IN | WEIGHT: 219.6 LBS | BODY MASS INDEX: 29.74 KG/M2

## 2025-08-25 DIAGNOSIS — M17.0 PRIMARY OSTEOARTHRITIS OF BOTH KNEES: ICD-10-CM

## 2025-08-25 DIAGNOSIS — M25.561 RIGHT KNEE PAIN, UNSPECIFIED CHRONICITY: Primary | ICD-10-CM

## 2025-08-25 RX ORDER — HYDRALAZINE HYDROCHLORIDE 10 MG/1
10 TABLET, FILM COATED ORAL 2 TIMES DAILY
COMMUNITY
Start: 2025-04-14

## 2025-08-25 RX ORDER — OLMESARTAN MEDOXOMIL AND HYDROCHLOROTHIAZIDE 40/12.5 40; 12.5 MG/1; MG/1
1 TABLET ORAL DAILY
COMMUNITY

## 2025-08-25 RX ORDER — ROSUVASTATIN CALCIUM 10 MG/1
TABLET, COATED ORAL
COMMUNITY
Start: 2025-05-16

## 2025-08-25 RX ORDER — MELOXICAM 15 MG/1
TABLET ORAL
COMMUNITY
Start: 2025-06-09

## 2025-08-25 RX ORDER — AMLODIPINE BESYLATE 5 MG/1
5 TABLET ORAL DAILY
COMMUNITY

## 2025-08-25 RX ORDER — METOPROLOL SUCCINATE 25 MG/1
25 TABLET, EXTENDED RELEASE ORAL DAILY
COMMUNITY
Start: 2025-04-14

## 2025-08-25 RX ORDER — ZOLPIDEM TARTRATE 10 MG/1
TABLET ORAL
COMMUNITY
Start: 2025-08-13

## 2025-08-25 RX ORDER — BETAMETHASONE SODIUM PHOSPHATE AND BETAMETHASONE ACETATE 3; 3 MG/ML; MG/ML
6 INJECTION, SUSPENSION INTRA-ARTICULAR; INTRALESIONAL; INTRAMUSCULAR; SOFT TISSUE ONCE
Status: COMPLETED | OUTPATIENT
Start: 2025-08-25 | End: 2025-08-25

## 2025-08-25 RX ORDER — POTASSIUM CHLORIDE 1500 MG/1
20 TABLET, EXTENDED RELEASE ORAL DAILY
COMMUNITY

## 2025-08-25 RX ORDER — GLIMEPIRIDE 4 MG/1
4 TABLET ORAL 2 TIMES DAILY
COMMUNITY

## 2025-08-25 RX ORDER — LIDOCAINE HYDROCHLORIDE 10 MG/ML
2 INJECTION, SOLUTION INFILTRATION; PERINEURAL ONCE
Status: COMPLETED | OUTPATIENT
Start: 2025-08-25 | End: 2025-08-25

## 2025-08-25 RX ORDER — LANSOPRAZOLE 30 MG/1
CAPSULE, DELAYED RELEASE ORAL
COMMUNITY

## 2025-08-25 RX ORDER — SEMAGLUTIDE 1.34 MG/ML
INJECTION, SOLUTION SUBCUTANEOUS
COMMUNITY

## 2025-08-25 RX ADMIN — BETAMETHASONE SODIUM PHOSPHATE AND BETAMETHASONE ACETATE 6 MG: 3; 3 INJECTION, SUSPENSION INTRA-ARTICULAR; INTRALESIONAL; INTRAMUSCULAR; SOFT TISSUE at 14:03

## 2025-08-25 RX ADMIN — LIDOCAINE HYDROCHLORIDE 2 ML: 10 INJECTION, SOLUTION INFILTRATION; PERINEURAL at 14:03

## (undated) DEVICE — SNAR POLYP CAPTIVATOR MICROHEX 13 240CM

## (undated) DEVICE — TBG SMPL FLTR LINE NASL 02/C02 A/ BX/100

## (undated) DEVICE — FRCP BIOP ENDO CAPTURAPRO SPK SERR 2.8MM 230CM

## (undated) DEVICE — SENSR O2 OXIMAX FNGR A/ 18IN NONSTR

## (undated) DEVICE — THE SINGLE USE ETRAP – POLYP TRAP IS USED FOR SUCTION RETRIEVAL OF ENDOSCOPICALLY REMOVED POLYPS.: Brand: ETRAP

## (undated) DEVICE — YANKAUER,BULB TIP WITH VENT: Brand: ARGYLE

## (undated) DEVICE — THE CHANNEL CLEANING BRUSH IS A NYLON FLEXI BRUSH ATTACHED TO A FLEXIBLE PLASTIC SHEATH DESIGNED TO SAFELY REMOVE DEBRIS FROM FLEXIBLE ENDOSCOPES.

## (undated) DEVICE — Device: Brand: DEFENDO AIR/WATER/SUCTION AND BIOPSY VALVE

## (undated) DEVICE — CONMED SCOPE SAVER BITE BLOCK, 20X27 MM: Brand: SCOPE SAVER

## (undated) DEVICE — MASK,OXYGEN,MED CONC,ADLT,7' TUB, UC: Brand: PENDING

## (undated) DEVICE — CUFF,BP,DISP,1 TUBE,ADULT,HP: Brand: MEDLINE

## (undated) DEVICE — FRCP BX RADJAW4 NDL 2.8 240 STD OG